# Patient Record
Sex: FEMALE | Race: WHITE | NOT HISPANIC OR LATINO | Employment: OTHER | URBAN - METROPOLITAN AREA
[De-identification: names, ages, dates, MRNs, and addresses within clinical notes are randomized per-mention and may not be internally consistent; named-entity substitution may affect disease eponyms.]

---

## 2017-12-21 ENCOUNTER — TELEPHONE (OUTPATIENT)
Dept: HEMATOLOGY/ONCOLOGY | Facility: CLINIC | Age: 67
End: 2017-12-21

## 2017-12-21 DIAGNOSIS — D50.0 ANEMIA DUE TO CHRONIC BLOOD LOSS: Primary | ICD-10-CM

## 2017-12-21 NOTE — TELEPHONE ENCOUNTER
----- Message from Eda Ruggiero sent at 12/21/2017  1:24 PM CST -----  Contact: spouse, Dr Teo Polo  Patient's spouse, Dr Teo Polo called asking for advice about getting his wife in due to blood work levels hemoglobin is 6.7 and HCT is 24.6, Red blood cell count is 3.65.  Please call patient's spouse at [phone]. Thanks!

## 2017-12-21 NOTE — TELEPHONE ENCOUNTER
Pt's spouse called to schedule an appt with Dr. Santiago.  The patient's H/H 6.7/24.6 with RBC 3.65.  Spouse states that she is asymptomatic and believes that this is conditional anemia.  Spouse was instructed to present to ED if pt developed any symptoms.  Spouse verbalized understanding.

## 2017-12-21 NOTE — TELEPHONE ENCOUNTER
Spoke with spouse to inform that labs have been ordered for 12/22.  They will present to the [facility] Draw for labs.  Verbalized understanding.

## 2017-12-22 ENCOUNTER — LAB VISIT (OUTPATIENT)
Dept: LAB | Facility: HOSPITAL | Age: 67
End: 2017-12-22
Attending: INTERNAL MEDICINE
Payer: MEDICARE

## 2017-12-22 DIAGNOSIS — D50.0 ANEMIA DUE TO CHRONIC BLOOD LOSS: ICD-10-CM

## 2017-12-22 LAB
ALBUMIN SERPL BCP-MCNC: 4.7 G/DL
ALP SERPL-CCNC: 74 U/L
ALT SERPL W/O P-5'-P-CCNC: 30 U/L
ANION GAP SERPL CALC-SCNC: 11 MMOL/L
AST SERPL-CCNC: 25 U/L
BILIRUB SERPL-MCNC: 0.3 MG/DL
BUN SERPL-MCNC: 8 MG/DL
CALCIUM SERPL-MCNC: 9.3 MG/DL
CHLORIDE SERPL-SCNC: 107 MMOL/L
CO2 SERPL-SCNC: 26 MMOL/L
CREAT SERPL-MCNC: 0.6 MG/DL
EST. GFR  (AFRICAN AMERICAN): >60 ML/MIN/1.73 M^2
EST. GFR  (NON AFRICAN AMERICAN): >60 ML/MIN/1.73 M^2
FERRITIN SERPL-MCNC: 6 NG/ML
GLUCOSE SERPL-MCNC: 91 MG/DL
IRON SATN MFR SERPL: 4 %
IRON SERPL-MCNC: 22 UG/DL
LDH SERPL L TO P-CCNC: 476 U/L
POTASSIUM SERPL-SCNC: 4 MMOL/L
PROT SERPL-MCNC: 7.6 G/DL
SODIUM SERPL-SCNC: 144 MMOL/L
TOTAL IRON BINDING CAPACITY: 513 UG/DL

## 2017-12-22 PROCEDURE — 83615 LACTATE (LD) (LDH) ENZYME: CPT

## 2017-12-22 PROCEDURE — 82728 ASSAY OF FERRITIN: CPT

## 2017-12-22 PROCEDURE — 83540 ASSAY OF IRON: CPT | Mod: PN

## 2017-12-22 PROCEDURE — 80053 COMPREHEN METABOLIC PANEL: CPT | Mod: PN

## 2017-12-22 PROCEDURE — 36415 COLL VENOUS BLD VENIPUNCTURE: CPT | Mod: PN

## 2017-12-22 PROCEDURE — 82728 ASSAY OF FERRITIN: CPT | Mod: PN

## 2017-12-22 PROCEDURE — 83540 ASSAY OF IRON: CPT

## 2017-12-22 PROCEDURE — 80053 COMPREHEN METABOLIC PANEL: CPT

## 2017-12-22 PROCEDURE — 83615 LACTATE (LD) (LDH) ENZYME: CPT | Mod: PN

## 2017-12-26 ENCOUNTER — TELEPHONE (OUTPATIENT)
Dept: HEMATOLOGY/ONCOLOGY | Facility: CLINIC | Age: 67
End: 2017-12-26

## 2017-12-26 ENCOUNTER — TELEPHONE (OUTPATIENT)
Dept: INFUSION THERAPY | Facility: HOSPITAL | Age: 67
End: 2017-12-26

## 2017-12-26 DIAGNOSIS — D50.9 IRON DEFICIENCY ANEMIA, UNSPECIFIED IRON DEFICIENCY ANEMIA TYPE: ICD-10-CM

## 2017-12-26 RX ORDER — DIPHENHYDRAMINE HYDROCHLORIDE 50 MG/ML
50 INJECTION INTRAMUSCULAR; INTRAVENOUS ONCE
Status: CANCELLED
Start: 2018-01-05

## 2017-12-26 RX ORDER — SODIUM CHLORIDE 0.9 % (FLUSH) 0.9 %
10 SYRINGE (ML) INJECTION
Status: CANCELLED | OUTPATIENT
Start: 2018-01-05

## 2017-12-26 RX ORDER — FAMOTIDINE 10 MG/ML
20 INJECTION INTRAVENOUS DAILY
Status: CANCELLED
Start: 2018-01-05

## 2017-12-26 RX ORDER — HEPARIN 100 UNIT/ML
500 SYRINGE INTRAVENOUS
Status: CANCELLED | OUTPATIENT
Start: 2018-01-05

## 2017-12-26 NOTE — TELEPHONE ENCOUNTER
[12/26/2017 11:35 AM] Samia Alaniz:   We need to place someone on for Ferrehem either Wed or Fri....could you all do?  Dr. CRISOSTOMO is in the process of doing build?  [12/26/2017 11:39 AM] Ana M Quintanilla:   feraheme is giving day1 and day 4 this would probably not be into after the 1st of the year   [12/26/2017 11:44 AM] Samia Alaniz:   Ok, can we start her next Tuesday and then Friday.  Her count is extremely low...   Thanks for helping out.  I am having to relearn the drugs.  [12/26/2017 11:45 AM] Samia Alaniz:   Dr. CRISOSTOMO will get the build compelted today  [12/26/2017 11:46 AM] Ana M Quintanilla:   Who is the patient?   [12/26/2017 11:57 AM] Samia Alaniz:   Lavern Polo [id number]   She will need to come in the am on Tuesday .  Just anytime before lunch.  How long does the infusion usually take?  [12/26/2017 12:00 PM] Ana M Quintanilla:   the best I can do is friday 01/05/18 @11:30 and 01/08/18 @11:30  [12/26/2017 12:01 PM] Samia Alaniz:   Ok, I will take that.  How long?  [12/26/2017 12:02 PM] Ana M Quintanilla:   1 hour  [12/26/2017 12:02 PM] Samia Alaniz:   okay thank you.  [12/26/2017 12:02 PM] Ana M Quintanilla:   welcome

## 2017-12-27 DIAGNOSIS — D50.0 ANEMIA DUE TO CHRONIC BLOOD LOSS: Primary | ICD-10-CM

## 2017-12-27 LAB — STFR SERPL-MCNC: 20.9 MG/L

## 2018-01-05 ENCOUNTER — INFUSION (OUTPATIENT)
Dept: INFUSION THERAPY | Facility: HOSPITAL | Age: 68
End: 2018-01-05
Attending: INTERNAL MEDICINE
Payer: MEDICARE

## 2018-01-05 VITALS
RESPIRATION RATE: 18 BRPM | TEMPERATURE: 99 F | HEART RATE: 66 BPM | WEIGHT: 142.5 LBS | SYSTOLIC BLOOD PRESSURE: 133 MMHG | DIASTOLIC BLOOD PRESSURE: 68 MMHG

## 2018-01-05 DIAGNOSIS — D50.9 IRON DEFICIENCY ANEMIA, UNSPECIFIED IRON DEFICIENCY ANEMIA TYPE: Primary | ICD-10-CM

## 2018-01-05 PROCEDURE — 63600175 PHARM REV CODE 636 W HCPCS: Mod: PN | Performed by: INTERNAL MEDICINE

## 2018-01-05 PROCEDURE — 25000003 PHARM REV CODE 250: Mod: PN | Performed by: INTERNAL MEDICINE

## 2018-01-05 PROCEDURE — 96367 TX/PROPH/DG ADDL SEQ IV INF: CPT | Mod: PN

## 2018-01-05 PROCEDURE — 96375 TX/PRO/DX INJ NEW DRUG ADDON: CPT | Mod: PN

## 2018-01-05 PROCEDURE — S0028 INJECTION, FAMOTIDINE, 20 MG: HCPCS | Mod: PN | Performed by: INTERNAL MEDICINE

## 2018-01-05 PROCEDURE — 96365 THER/PROPH/DIAG IV INF INIT: CPT | Mod: PN

## 2018-01-05 RX ORDER — SODIUM CHLORIDE 0.9 % (FLUSH) 0.9 %
10 SYRINGE (ML) INJECTION
Status: CANCELLED | OUTPATIENT
Start: 2018-01-15

## 2018-01-05 RX ORDER — FAMOTIDINE 10 MG/ML
20 INJECTION INTRAVENOUS DAILY
Status: CANCELLED
Start: 2018-01-15

## 2018-01-05 RX ORDER — FAMOTIDINE 10 MG/ML
20 INJECTION INTRAVENOUS DAILY
Status: DISCONTINUED | OUTPATIENT
Start: 2018-01-05 | End: 2018-01-05 | Stop reason: HOSPADM

## 2018-01-05 RX ORDER — SODIUM CHLORIDE 0.9 % (FLUSH) 0.9 %
10 SYRINGE (ML) INJECTION
Status: DISCONTINUED | OUTPATIENT
Start: 2018-01-05 | End: 2018-01-05 | Stop reason: HOSPADM

## 2018-01-05 RX ORDER — DEXAMETHASONE SODIUM PHOSPHATE 4 MG/ML
4 INJECTION, SOLUTION INTRA-ARTICULAR; INTRALESIONAL; INTRAMUSCULAR; INTRAVENOUS; SOFT TISSUE
Status: COMPLETED | OUTPATIENT
Start: 2018-01-05 | End: 2018-01-05

## 2018-01-05 RX ORDER — DIPHENHYDRAMINE HYDROCHLORIDE 50 MG/ML
50 INJECTION INTRAMUSCULAR; INTRAVENOUS ONCE
Status: CANCELLED
Start: 2018-01-15

## 2018-01-05 RX ORDER — CARISOPRODOL 350 MG/1
350 TABLET ORAL
Refills: 0 | COMMUNITY
Start: 2017-12-15 | End: 2019-09-20

## 2018-01-05 RX ORDER — DIPHENHYDRAMINE HYDROCHLORIDE 50 MG/ML
50 INJECTION INTRAMUSCULAR; INTRAVENOUS ONCE
Status: DISCONTINUED | OUTPATIENT
Start: 2018-01-05 | End: 2018-01-05

## 2018-01-05 RX ORDER — HEPARIN 100 UNIT/ML
500 SYRINGE INTRAVENOUS
Status: CANCELLED | OUTPATIENT
Start: 2018-01-15

## 2018-01-05 RX ADMIN — DIPHENHYDRAMINE HYDROCHLORIDE 50 MG: 50 INJECTION, SOLUTION INTRAMUSCULAR; INTRAVENOUS at 11:01

## 2018-01-05 RX ADMIN — SODIUM CHLORIDE: 9 INJECTION, SOLUTION INTRAVENOUS at 11:01

## 2018-01-05 RX ADMIN — FAMOTIDINE 20 MG: 10 INJECTION, SOLUTION INTRAVENOUS at 11:01

## 2018-01-05 RX ADMIN — FERUMOXYTOL 510 MG: 510 INJECTION INTRAVENOUS at 12:01

## 2018-01-05 RX ADMIN — DEXAMETHASONE SODIUM PHOSPHATE 4 MG: 4 INJECTION, SOLUTION INTRA-ARTICULAR; INTRALESIONAL; INTRAMUSCULAR; INTRAVENOUS; SOFT TISSUE at 11:01

## 2018-01-08 ENCOUNTER — OFFICE VISIT (OUTPATIENT)
Dept: HEMATOLOGY/ONCOLOGY | Facility: CLINIC | Age: 68
End: 2018-01-08
Payer: MEDICARE

## 2018-01-08 ENCOUNTER — INFUSION (OUTPATIENT)
Dept: INFUSION THERAPY | Facility: HOSPITAL | Age: 68
End: 2018-01-08
Attending: INTERNAL MEDICINE
Payer: MEDICARE

## 2018-01-08 VITALS
HEART RATE: 59 BPM | HEIGHT: 63 IN | BODY MASS INDEX: 24.18 KG/M2 | SYSTOLIC BLOOD PRESSURE: 146 MMHG | TEMPERATURE: 98 F | RESPIRATION RATE: 16 BRPM | WEIGHT: 136.44 LBS | DIASTOLIC BLOOD PRESSURE: 86 MMHG

## 2018-01-08 VITALS
BODY MASS INDEX: 24.18 KG/M2 | HEIGHT: 63 IN | TEMPERATURE: 98 F | RESPIRATION RATE: 16 BRPM | HEART RATE: 61 BPM | DIASTOLIC BLOOD PRESSURE: 79 MMHG | SYSTOLIC BLOOD PRESSURE: 119 MMHG | WEIGHT: 136.44 LBS

## 2018-01-08 DIAGNOSIS — D50.9 IRON DEFICIENCY ANEMIA, UNSPECIFIED IRON DEFICIENCY ANEMIA TYPE: Primary | ICD-10-CM

## 2018-01-08 PROCEDURE — 96365 THER/PROPH/DIAG IV INF INIT: CPT | Mod: PN

## 2018-01-08 PROCEDURE — S0028 INJECTION, FAMOTIDINE, 20 MG: HCPCS | Mod: PN | Performed by: INTERNAL MEDICINE

## 2018-01-08 PROCEDURE — A4216 STERILE WATER/SALINE, 10 ML: HCPCS | Mod: PN | Performed by: INTERNAL MEDICINE

## 2018-01-08 PROCEDURE — 96375 TX/PRO/DX INJ NEW DRUG ADDON: CPT | Mod: PN

## 2018-01-08 PROCEDURE — 99213 OFFICE O/P EST LOW 20 MIN: CPT | Mod: PBBFAC,PN,25 | Performed by: INTERNAL MEDICINE

## 2018-01-08 PROCEDURE — 63600175 PHARM REV CODE 636 W HCPCS: Mod: TB,PN | Performed by: INTERNAL MEDICINE

## 2018-01-08 PROCEDURE — 25000003 PHARM REV CODE 250: Mod: PN | Performed by: INTERNAL MEDICINE

## 2018-01-08 PROCEDURE — 99204 OFFICE O/P NEW MOD 45 MIN: CPT | Mod: S$PBB,,, | Performed by: INTERNAL MEDICINE

## 2018-01-08 PROCEDURE — 99999 PR PBB SHADOW E&M-EST. PATIENT-LVL III: CPT | Mod: PBBFAC,,, | Performed by: INTERNAL MEDICINE

## 2018-01-08 RX ORDER — FAMOTIDINE 10 MG/ML
20 INJECTION INTRAVENOUS DAILY
Status: CANCELLED
Start: 2018-01-15

## 2018-01-08 RX ORDER — DIPHENHYDRAMINE HYDROCHLORIDE 50 MG/ML
25 INJECTION INTRAMUSCULAR; INTRAVENOUS ONCE
Status: COMPLETED | OUTPATIENT
Start: 2018-01-08 | End: 2018-01-08

## 2018-01-08 RX ORDER — SODIUM CHLORIDE 0.9 % (FLUSH) 0.9 %
10 SYRINGE (ML) INJECTION
Status: CANCELLED | OUTPATIENT
Start: 2018-01-15

## 2018-01-08 RX ORDER — HEPARIN 100 UNIT/ML
500 SYRINGE INTRAVENOUS
Status: CANCELLED | OUTPATIENT
Start: 2018-01-15

## 2018-01-08 RX ORDER — SODIUM CHLORIDE 0.9 % (FLUSH) 0.9 %
10 SYRINGE (ML) INJECTION
Status: DISCONTINUED | OUTPATIENT
Start: 2018-01-08 | End: 2018-01-08 | Stop reason: HOSPADM

## 2018-01-08 RX ORDER — FAMOTIDINE 10 MG/ML
20 INJECTION INTRAVENOUS DAILY
Status: DISCONTINUED | OUTPATIENT
Start: 2018-01-08 | End: 2018-01-08 | Stop reason: HOSPADM

## 2018-01-08 RX ORDER — DIPHENHYDRAMINE HYDROCHLORIDE 50 MG/ML
25 INJECTION INTRAMUSCULAR; INTRAVENOUS ONCE
Status: CANCELLED
Start: 2018-01-15

## 2018-01-08 RX ORDER — DEXAMETHASONE SODIUM PHOSPHATE 4 MG/ML
4 INJECTION, SOLUTION INTRA-ARTICULAR; INTRALESIONAL; INTRAMUSCULAR; INTRAVENOUS; SOFT TISSUE
Status: COMPLETED | OUTPATIENT
Start: 2018-01-08 | End: 2018-01-08

## 2018-01-08 RX ADMIN — FERUMOXYTOL 510 MG: 510 INJECTION INTRAVENOUS at 09:01

## 2018-01-08 RX ADMIN — SODIUM CHLORIDE: 900 INJECTION, SOLUTION INTRAVENOUS at 09:01

## 2018-01-08 RX ADMIN — DIPHENHYDRAMINE HYDROCHLORIDE 25 MG: 50 INJECTION INTRAMUSCULAR; INTRAVENOUS at 09:01

## 2018-01-08 RX ADMIN — FAMOTIDINE 20 MG: 10 INJECTION, SOLUTION INTRAVENOUS at 09:01

## 2018-01-08 RX ADMIN — Medication 10 ML: at 09:01

## 2018-01-08 RX ADMIN — DEXAMETHASONE SODIUM PHOSPHATE 4 MG: 4 INJECTION, SOLUTION INTRA-ARTICULAR; INTRALESIONAL; INTRAMUSCULAR; INTRAVENOUS; SOFT TISSUE at 09:01

## 2018-01-08 NOTE — PATIENT INSTRUCTIONS
Iron-Deficiency Anemia (Adult)  Red blood cells carry oxygen to the tissues of your body. Anemia is a condition in which you have too few red blood cells. You need iron to make red cells. Anemia makes you feel tired and run down. When anemia becomes severe, your skin becomes pale. You may feel short of breath after physical activity. Other symptoms include:  · Headaches  · Dizziness  · Leg cramps with physical activity  · Drowsiness  · Restless legs  Your anemia is caused by not having enough iron in your body. This may be because of:  · Loss of blood. This can be caused by heavy menstrual periods. It can also be caused by bleeding from the stomach or intestines.  · Poor diet. You may not be eating enough foods that contain iron.  · Inability to absorb iron from the foods you eat  · Pregnancy  If your blood count is low enough, your healthcare provider may prescribe an iron supplement. It usually takes about 2 to 3 months of treatment with iron supplements to correct anemia. Severe cases of anemia need a blood transfusion to quickly ease symptoms and deliver more oxygen to the cells.  Home care  Follow these guidelines when caring for yourself at home:  · Eat foods high in iron. This will boost the amount of iron stored in your body. It is a natural way to build up the number of blood cells. Good sources of iron include beef, liver, spinach and other dark green leafy vegetables, whole grains, beans, and nuts.  · Do not overexert yourself.  · Talk with your healthcare provider before traveling by air or traveling to high altitudes.  Follow-up care  Follow up with your healthcare provider in 2 months, or as advised. This is to have another red blood cell count to be sure your anemia has been fixed.  When to seek medical advice  Call your healthcare provider right away if any of these occur:  · Shortness of breath or chest pain  · Dizziness or fainting  · Vomiting blood or passing red or black-colored stool   Date  Last Reviewed: 2/25/2016  © 3013-0760 [employer]. [address] [place] [address], [place] [address]. All rights reserved. This information is not intended as a substitute for professional medical care. Always follow your healthcare professional's instructions.

## 2018-01-08 NOTE — PLAN OF CARE
Problem: Patient Care Overview  Goal: Plan of Care Review  Outcome: Ongoing (interventions implemented as appropriate)  Pt tolerated infusion well, NAD, no c/o voiced, pt given AVS with appointment schedule, pt ambulated off unit without difficulty with .

## 2018-01-09 NOTE — PROGRESS NOTES
Date of Service: 2018  CONSULT NOTE    HISTORY OF PRESENT ILLNESS:  The patient is a 67-year-old white female who   presents as self-referral after incidentally discovering profound anemia on   health maintenance laboratory.  The patient's hemoglobin was 6.7.  She had iron   studies obtained, which were remarkable for profound deficiency.  She does not   exclude dietary sources of iron and does not routinely use PPI.  No overt signs   or symptoms of anemia or GI blood loss.  No other complaints or pertinent   findings on a 14-point review of systems.  The patient has been placed on   replacement Feraheme and has completed one to two doses.  She was mildly sedated   with Benadryl premeds and request that we half dose this before the second   treatment.  No other pertinent findings on a 14-point review of systems.    PAST MEDICAL HISTORY:  1.  Iron deficiency anemia.  2.  Status post appendectomy.    ALLERGIES:  Ibuprofen.    MEDICATIONS:  Soma p.r.n. muscle spasm.    FAMILY AND SOCIAL HISTORY:  The patient uses alcohol two times a week.  Smokes   half pack of cigarettes weekly.  Father  from coronary artery disease.    Mother  from staph infection.  Sibling  due to CNS malignancy.    PHYSICAL EXAMINATION:  GENERAL:  Well-developed, thin-appearing, white female in no acute distress.  VITAL SIGNS:  Weight 136-1/2 pounds.  Documented in EMR and reviewed.  HEENT:  Normocephalic, atraumatic.  Oral mucosa pink and moist.  Lips without   lesions.  Tongue midline.  Oropharynx clear.  Nonicteric sclerae.   NECK:  Supple, no adenopathy.  No carotid bruits, thyromegaly or thyroid nodule.  HEART:  Regular rate and rhythm without murmur, gallop or rub.                LUNGS:  Clear to auscultation bilaterally.  Normal respiratory effort.       ABDOMEN:  Soft, nontender, nondistended with positive normoactive bowel sounds,   no hepatosplenomegaly.    EXTREMITIES:  No cyanosis, clubbing or edema.   Distal pulses are intact.  AXILLAE AND GROIN:  No palpable pathologic lymphadenopathy is appreciated.   SKIN:  Intact/turgor normal.  NEUROLOGIC:  Cranial nerves II-XII grossly intact.  Motor:  Good muscle bulk and   tone.  Strength/sensory 5/5 throughout.  Gait stable.    LABORATORY:  Obtained at [facility] and dated 12/2017 reveals an H and H   6.7 and 24.6, platelets of 300 and MCV is depressed at 67.4.  Differential was   remarkable, white count is 4.6.  Sodium 142, potassium 3.9, chloride 109, CO2   25, BUN 9, creatinine 0.6, glucose 96.  Liver function tests are within normal   limits.  GFR 95.  Hemoccult negative x3.    IMPRESSION:  1.  Iron deficiency anemia.  2.  Oversedation on Benadryl.    PLAN:  1.  Proceed with second dose of Feraheme.  2.  Decrease Benadryl premeds at 25 mg.  3.  Return to clinic in three weeks with interval CBC to document response.  4.  Once the patient's hemoglobin and hematocrit are better compensated, we will   consider elective colonoscopy as the patient has not had age-appropriate   screening.      SARAH/HN  dd: 01/08/2018 08:50:11 (CST)  td: 01/08/2018 23:50:43 (CST)  Doc ID   #[id number]  Job ID #[id number]    CC:

## 2018-01-26 ENCOUNTER — LAB VISIT (OUTPATIENT)
Dept: LAB | Facility: HOSPITAL | Age: 68
End: 2018-01-26
Attending: INTERNAL MEDICINE
Payer: MEDICARE

## 2018-01-26 DIAGNOSIS — D50.9 IRON DEFICIENCY ANEMIA, UNSPECIFIED IRON DEFICIENCY ANEMIA TYPE: ICD-10-CM

## 2018-01-26 LAB
ANISOCYTOSIS BLD QL SMEAR: ABNORMAL
BASOPHILS # BLD AUTO: 0.02 K/UL
BASOPHILS NFR BLD: 0.4 %
DIFFERENTIAL METHOD: ABNORMAL
EOSINOPHIL # BLD AUTO: 0.1 K/UL
EOSINOPHIL NFR BLD: 1.8 %
ERYTHROCYTE [DISTWIDTH] IN BLOOD BY AUTOMATED COUNT: ABNORMAL %
HCT VFR BLD AUTO: 35.3 %
HGB BLD-MCNC: 10.8 G/DL
HYPOCHROMIA BLD QL SMEAR: ABNORMAL
LYMPHOCYTES # BLD AUTO: 1.2 K/UL
LYMPHOCYTES NFR BLD: 21.2 %
MCH RBC QN AUTO: 24.4 PG
MCHC RBC AUTO-ENTMCNC: 30.6 G/DL
MCV RBC AUTO: 80 FL
MONOCYTES # BLD AUTO: 0.5 K/UL
MONOCYTES NFR BLD: 8.2 %
NEUTROPHILS # BLD AUTO: 3.9 K/UL
NEUTROPHILS NFR BLD: 68.4 %
PLATELET # BLD AUTO: 406 K/UL
PLATELET BLD QL SMEAR: ABNORMAL
PMV BLD AUTO: 9.7 FL
RBC # BLD AUTO: 4.43 M/UL
WBC # BLD AUTO: 5.7 K/UL

## 2018-01-26 PROCEDURE — 36415 COLL VENOUS BLD VENIPUNCTURE: CPT | Mod: PO

## 2018-01-26 PROCEDURE — 85025 COMPLETE CBC W/AUTO DIFF WBC: CPT | Mod: PO

## 2018-01-29 ENCOUNTER — OFFICE VISIT (OUTPATIENT)
Dept: HEMATOLOGY/ONCOLOGY | Facility: CLINIC | Age: 68
End: 2018-01-29
Payer: MEDICARE

## 2018-01-29 VITALS
DIASTOLIC BLOOD PRESSURE: 66 MMHG | HEIGHT: 63 IN | TEMPERATURE: 98 F | SYSTOLIC BLOOD PRESSURE: 127 MMHG | BODY MASS INDEX: 24.38 KG/M2 | WEIGHT: 137.56 LBS | RESPIRATION RATE: 17 BRPM | HEART RATE: 52 BPM

## 2018-01-29 DIAGNOSIS — D50.9 IRON DEFICIENCY ANEMIA, UNSPECIFIED IRON DEFICIENCY ANEMIA TYPE: Primary | ICD-10-CM

## 2018-01-29 PROCEDURE — 99213 OFFICE O/P EST LOW 20 MIN: CPT | Mod: PBBFAC,PN | Performed by: INTERNAL MEDICINE

## 2018-01-29 PROCEDURE — 99999 PR PBB SHADOW E&M-EST. PATIENT-LVL III: CPT | Mod: PBBFAC,,, | Performed by: INTERNAL MEDICINE

## 2018-01-29 PROCEDURE — 99213 OFFICE O/P EST LOW 20 MIN: CPT | Mod: S$PBB,,, | Performed by: INTERNAL MEDICINE

## 2018-01-29 RX ORDER — FLUTICASONE PROPIONATE AND SALMETEROL 50; 250 UG/1; UG/1
POWDER RESPIRATORY (INHALATION)
Refills: 3 | COMMUNITY
Start: 2017-12-13 | End: 2018-01-29

## 2018-01-29 RX ORDER — HYDROCHLOROTHIAZIDE 25 MG/1
TABLET ORAL DAILY PRN
COMMUNITY
Start: 2018-01-15 | End: 2022-05-02

## 2018-01-29 RX ORDER — ALBUTEROL SULFATE 90 UG/1
AEROSOL, METERED RESPIRATORY (INHALATION)
COMMUNITY
Start: 2018-01-09 | End: 2018-01-29

## 2018-01-31 ENCOUNTER — TELEPHONE (OUTPATIENT)
Dept: GASTROENTEROLOGY | Facility: CLINIC | Age: 68
End: 2018-01-31

## 2018-01-31 DIAGNOSIS — Z12.11 SCREENING FOR COLORECTAL CANCER: Primary | ICD-10-CM

## 2018-01-31 DIAGNOSIS — Z12.12 SCREENING FOR COLORECTAL CANCER: Primary | ICD-10-CM

## 2018-01-31 NOTE — TELEPHONE ENCOUNTER
Spoke with  instructed received referral for colonoscopy states he could not talk will call back.

## 2018-02-02 ENCOUNTER — PATIENT MESSAGE (OUTPATIENT)
Dept: HEMATOLOGY/ONCOLOGY | Facility: CLINIC | Age: 68
End: 2018-02-02

## 2018-02-28 ENCOUNTER — PATIENT MESSAGE (OUTPATIENT)
Dept: HEMATOLOGY/ONCOLOGY | Facility: CLINIC | Age: 68
End: 2018-02-28

## 2018-04-27 ENCOUNTER — LAB VISIT (OUTPATIENT)
Dept: LAB | Facility: HOSPITAL | Age: 68
End: 2018-04-27
Attending: INTERNAL MEDICINE
Payer: MEDICARE

## 2018-04-27 DIAGNOSIS — D50.9 IRON DEFICIENCY ANEMIA, UNSPECIFIED IRON DEFICIENCY ANEMIA TYPE: ICD-10-CM

## 2018-04-27 LAB
BASOPHILS # BLD AUTO: 0.01 K/UL
BASOPHILS NFR BLD: 0.2 %
DIFFERENTIAL METHOD: ABNORMAL
EOSINOPHIL # BLD AUTO: 0.2 K/UL
EOSINOPHIL NFR BLD: 2.8 %
ERYTHROCYTE [DISTWIDTH] IN BLOOD BY AUTOMATED COUNT: 14.1 %
HCT VFR BLD AUTO: 36.5 %
HGB BLD-MCNC: 12 G/DL
LYMPHOCYTES # BLD AUTO: 1.8 K/UL
LYMPHOCYTES NFR BLD: 28.7 %
MCH RBC QN AUTO: 30.8 PG
MCHC RBC AUTO-ENTMCNC: 32.9 G/DL
MCV RBC AUTO: 94 FL
MONOCYTES # BLD AUTO: 0.6 K/UL
MONOCYTES NFR BLD: 9.4 %
NEUTROPHILS # BLD AUTO: 3.6 K/UL
NEUTROPHILS NFR BLD: 58.9 %
PLATELET # BLD AUTO: 246 K/UL
PMV BLD AUTO: 9.4 FL
RBC # BLD AUTO: 3.89 M/UL
WBC # BLD AUTO: 6.16 K/UL

## 2018-04-27 PROCEDURE — 85025 COMPLETE CBC W/AUTO DIFF WBC: CPT | Mod: PO

## 2018-04-27 PROCEDURE — 36415 COLL VENOUS BLD VENIPUNCTURE: CPT | Mod: PO

## 2018-04-30 ENCOUNTER — OFFICE VISIT (OUTPATIENT)
Dept: HEMATOLOGY/ONCOLOGY | Facility: CLINIC | Age: 68
End: 2018-04-30
Payer: MEDICARE

## 2018-04-30 VITALS
WEIGHT: 138.69 LBS | HEART RATE: 62 BPM | RESPIRATION RATE: 16 BRPM | SYSTOLIC BLOOD PRESSURE: 114 MMHG | BODY MASS INDEX: 24.57 KG/M2 | DIASTOLIC BLOOD PRESSURE: 63 MMHG | HEIGHT: 63 IN | TEMPERATURE: 98 F

## 2018-04-30 DIAGNOSIS — D50.9 IRON DEFICIENCY ANEMIA, UNSPECIFIED IRON DEFICIENCY ANEMIA TYPE: Primary | ICD-10-CM

## 2018-04-30 PROCEDURE — 99213 OFFICE O/P EST LOW 20 MIN: CPT | Mod: PBBFAC,PN | Performed by: INTERNAL MEDICINE

## 2018-04-30 PROCEDURE — 99999 PR PBB SHADOW E&M-EST. PATIENT-LVL III: CPT | Mod: PBBFAC,,, | Performed by: INTERNAL MEDICINE

## 2018-04-30 PROCEDURE — 99213 OFFICE O/P EST LOW 20 MIN: CPT | Mod: S$PBB,,, | Performed by: INTERNAL MEDICINE

## 2018-04-30 NOTE — PROGRESS NOTES
The patient is a 67-year-old white female well known to me for iron deficiency   anemia who received intravenous Feraheme had a good initial response and returns   to clinic for interval reevaluation.  She has no signs or symptoms consistent   with GI blood loss and denies any symptoms of anemia.    PHYSICAL EXAMINATION:  GENERAL:  Well-developed, well-nourished elderly white female in no acute   distress.  VITAL SIGNS:  Weight of 138-1/2 pounds.  HEENT:  Normocephalic, atraumatic.  Oral mucosa pink and moist.  Lips without   lesions.  Tongue midline.  Oropharynx clear.  Nonicteric sclerae.   NECK:  Supple.  No adenopathy.                                               HEART:  Regular rate and rhythm without murmur, gallop or rub.               LUNGS:  Clear to auscultation bilaterally.                                   ABDOMEN:  Soft, nontender and nondistended with positive normoactive bowel   sounds.  No hepatosplenomegaly.                                              EXTREMITIES:  No cyanosis, clubbing or edema.  Distal pulses are intact.       LABORATORY:  White count 6.2, H and H 12.0 and 36.5, platelets 246.    IMPRESSION:  Iron deficiency anemia -- resolved following Feraheme.    PLAN:  Reevaluate in six months from now with interval CBC.      SARAH/HN  dd: 04/30/2018 13:48:38 (CDT)  td: 04/30/2018 22:51:21 (CDT)  Doc ID   #[id number]  Job ID #[id number]    CC:

## 2018-05-01 ENCOUNTER — PATIENT MESSAGE (OUTPATIENT)
Dept: HEMATOLOGY/ONCOLOGY | Facility: CLINIC | Age: 68
End: 2018-05-01

## 2018-10-31 ENCOUNTER — LAB VISIT (OUTPATIENT)
Dept: LAB | Facility: HOSPITAL | Age: 68
End: 2018-10-31
Attending: INTERNAL MEDICINE
Payer: MEDICARE

## 2018-10-31 ENCOUNTER — OFFICE VISIT (OUTPATIENT)
Dept: HEMATOLOGY/ONCOLOGY | Facility: CLINIC | Age: 68
End: 2018-10-31
Payer: MEDICARE

## 2018-10-31 VITALS
HEIGHT: 63 IN | WEIGHT: 147.69 LBS | RESPIRATION RATE: 18 BRPM | TEMPERATURE: 99 F | HEART RATE: 64 BPM | DIASTOLIC BLOOD PRESSURE: 71 MMHG | SYSTOLIC BLOOD PRESSURE: 113 MMHG | BODY MASS INDEX: 26.17 KG/M2

## 2018-10-31 DIAGNOSIS — D50.9 IRON DEFICIENCY ANEMIA, UNSPECIFIED IRON DEFICIENCY ANEMIA TYPE: ICD-10-CM

## 2018-10-31 DIAGNOSIS — D50.9 IRON DEFICIENCY ANEMIA, UNSPECIFIED IRON DEFICIENCY ANEMIA TYPE: Primary | ICD-10-CM

## 2018-10-31 LAB
BASOPHILS # BLD AUTO: 0.04 K/UL
BASOPHILS NFR BLD: 0.6 %
DIFFERENTIAL METHOD: ABNORMAL
EOSINOPHIL # BLD AUTO: 0.1 K/UL
EOSINOPHIL NFR BLD: 2 %
ERYTHROCYTE [DISTWIDTH] IN BLOOD BY AUTOMATED COUNT: 12.5 %
HCT VFR BLD AUTO: 34.7 %
HGB BLD-MCNC: 11.2 G/DL
LYMPHOCYTES # BLD AUTO: 2 K/UL
LYMPHOCYTES NFR BLD: 29 %
MCH RBC QN AUTO: 29.9 PG
MCHC RBC AUTO-ENTMCNC: 32.3 G/DL
MCV RBC AUTO: 93 FL
MONOCYTES # BLD AUTO: 0.6 K/UL
MONOCYTES NFR BLD: 9 %
NEUTROPHILS # BLD AUTO: 4.1 K/UL
NEUTROPHILS NFR BLD: 59.4 %
NRBC BLD-RTO: 0 /100 WBC
PLATELET # BLD AUTO: 322 K/UL
PMV BLD AUTO: 9.4 FL
RBC # BLD AUTO: 3.75 M/UL
WBC # BLD AUTO: 6.9 K/UL

## 2018-10-31 PROCEDURE — 99999 PR PBB SHADOW E&M-EST. PATIENT-LVL III: CPT | Mod: PBBFAC,,, | Performed by: NURSE PRACTITIONER

## 2018-10-31 PROCEDURE — 85025 COMPLETE CBC W/AUTO DIFF WBC: CPT

## 2018-10-31 PROCEDURE — 36415 COLL VENOUS BLD VENIPUNCTURE: CPT | Mod: PN

## 2018-10-31 PROCEDURE — 99213 OFFICE O/P EST LOW 20 MIN: CPT | Mod: S$PBB,,, | Performed by: NURSE PRACTITIONER

## 2018-10-31 PROCEDURE — 99213 OFFICE O/P EST LOW 20 MIN: CPT | Mod: PBBFAC,PN | Performed by: NURSE PRACTITIONER

## 2018-10-31 PROCEDURE — 85025 COMPLETE CBC W/AUTO DIFF WBC: CPT | Mod: PN

## 2018-10-31 NOTE — PROGRESS NOTES
HISTORY OF PRESENT ILLNESS:  The patient is a 68-year-old white female   well known to Dr. Santiago for iron deficiency anemia.  Last labs obtained were   in 2005 in which she was normal.  Her , a retired physician, ordered   labs for evaluation.  Her hemoglobin was found to be 6.7.  She was asymptomatic.    She carried on daily activities as well as watching young grandchildren without   a problem.  Iron studies were depleted and stools were negative for blood.    She was infused with Feraheme in December 2017 & January 2018.      She presents to the clinic today with her  for a 6 month interval evaluation.    She continues to stay active.  She denies any dyspnea, chest pain, irregular   heart rate, weakness, fatigue, bleeding, pica, cold hands/feet, etc.  No new   complaints or pertinent findings on a 10 point review of systems.    PHYSICAL EXAMINATION:  GENERAL:  Well-developed, well-nourished elderly white female in no acute   distress.  Alert & oriented x 3.  VITAL SIGNS:  Weight:  Gain of 9 pounds in 6 months  Wt Readings from Last 3 Encounters:   10/31/18 67 kg (147 lb 11.3 oz)   04/30/18 62.9 kg (138 lb 10.7 oz)   01/29/18 62.4 kg (137 lb 9.1 oz)     Temp Readings from Last 3 Encounters:   10/31/18 98.5 °F (36.9 °C)   04/30/18 98 °F (36.7 °C)   01/29/18 97.7 °F (36.5 °C) (Oral)     BP Readings from Last 3 Encounters:   10/31/18 113/71   04/30/18 114/63   01/29/18 127/66     Pulse Readings from Last 3 Encounters:   10/31/18 64   04/30/18 62   01/29/18 (!) 52     HEENT:  Normocephalic, atraumatic.  Oral mucosa pink and moist.  Lips without   lesions.  Tongue midline.  Oropharynx clear.  Nonicteric sclerae.   NECK:  Supple.  No adenopathy.                                               HEART:  Regular rate and rhythm without murmur, gallop or rub.               LUNGS:  Clear to auscultation bilaterally.                                   ABDOMEN:  Soft, nontender and nondistended with positive  normoactive bowel   sounds.  No hepatosplenomegaly.                                              EXTREMITIES:  No cyanosis, clubbing or edema.  Distal pulses are intact.       LABORATORY:   Lab Results   Component Value Date    WBC 6.90 10/31/2018    HGB 11.2 (L) 10/31/2018    HCT 34.7 (L) 10/31/2018    MCV 93 10/31/2018     10/31/2018     Declining hemoglobin 11.2 (12.0, 10.8, 6.7)    IMPRESSION:  Iron deficiency anemia -- s/p Feraheme, asymptomatic.    PLAN:  Reevaluate in six months from now with interval CBC.    Assessment/plan reviewed and approved by Dr. Santiago.

## 2019-04-18 ENCOUNTER — TELEPHONE (OUTPATIENT)
Dept: HEMATOLOGY/ONCOLOGY | Facility: CLINIC | Age: 69
End: 2019-04-18

## 2019-04-18 NOTE — TELEPHONE ENCOUNTER
Attempted to call pt, re appt on 4/30/19.  MD will be out of office.  No answer, unable to leave message.  Appt moved to RW schedule.

## 2019-04-30 ENCOUNTER — OFFICE VISIT (OUTPATIENT)
Dept: HEMATOLOGY/ONCOLOGY | Facility: CLINIC | Age: 69
End: 2019-04-30
Payer: MEDICARE

## 2019-04-30 ENCOUNTER — LAB VISIT (OUTPATIENT)
Dept: LAB | Facility: HOSPITAL | Age: 69
End: 2019-04-30
Attending: NURSE PRACTITIONER
Payer: MEDICARE

## 2019-04-30 VITALS
RESPIRATION RATE: 18 BRPM | SYSTOLIC BLOOD PRESSURE: 107 MMHG | HEART RATE: 58 BPM | BODY MASS INDEX: 25.62 KG/M2 | DIASTOLIC BLOOD PRESSURE: 69 MMHG | HEIGHT: 63 IN | TEMPERATURE: 98 F | WEIGHT: 144.63 LBS

## 2019-04-30 DIAGNOSIS — D50.9 IRON DEFICIENCY ANEMIA, UNSPECIFIED IRON DEFICIENCY ANEMIA TYPE: Primary | ICD-10-CM

## 2019-04-30 DIAGNOSIS — D50.9 IRON DEFICIENCY ANEMIA, UNSPECIFIED IRON DEFICIENCY ANEMIA TYPE: ICD-10-CM

## 2019-04-30 LAB
BASOPHILS # BLD AUTO: 0.04 K/UL (ref 0–0.2)
BASOPHILS NFR BLD: 0.8 % (ref 0–1.9)
DIFFERENTIAL METHOD: ABNORMAL
EOSINOPHIL # BLD AUTO: 0.1 K/UL (ref 0–0.5)
EOSINOPHIL NFR BLD: 2.9 % (ref 0–8)
ERYTHROCYTE [DISTWIDTH] IN BLOOD BY AUTOMATED COUNT: 14.6 % (ref 11.5–14.5)
FERRITIN SERPL-MCNC: 6 NG/ML (ref 12–264)
HCT VFR BLD AUTO: 31.2 % (ref 37–48.5)
HGB BLD-MCNC: 9.7 G/DL (ref 12–16)
IMM GRANULOCYTES # BLD AUTO: 0.02 K/UL (ref 0–0.04)
IMM GRANULOCYTES NFR BLD AUTO: 0.4 % (ref 0–0.5)
IRON SATN MFR SERPL: 14 % (ref 20–50)
IRON SERPL-MCNC: 60 UG/DL (ref 30–160)
LYMPHOCYTES # BLD AUTO: 1.4 K/UL (ref 1–4.8)
LYMPHOCYTES NFR BLD: 29.2 % (ref 18–48)
MCH RBC QN AUTO: 26.1 PG (ref 27–31)
MCHC RBC AUTO-ENTMCNC: 31.1 G/DL (ref 32–36)
MCV RBC AUTO: 84 FL (ref 82–98)
MONOCYTES # BLD AUTO: 0.6 K/UL (ref 0.3–1)
MONOCYTES NFR BLD: 11.8 % (ref 4–15)
NEUTROPHILS # BLD AUTO: 2.6 K/UL (ref 1.8–7.7)
NEUTROPHILS NFR BLD: 54.9 % (ref 38–73)
NRBC BLD-RTO: 0 /100 WBC
PLATELET # BLD AUTO: 298 K/UL (ref 150–350)
PMV BLD AUTO: 9.9 FL (ref 9.2–12.9)
RBC # BLD AUTO: 3.71 M/UL (ref 4–5.4)
TOTAL IRON BINDING CAPACITY: 428 UG/DL (ref 265–497)
WBC # BLD AUTO: 4.76 K/UL (ref 3.9–12.7)

## 2019-04-30 PROCEDURE — 99214 OFFICE O/P EST MOD 30 MIN: CPT | Mod: S$PBB,,, | Performed by: NURSE PRACTITIONER

## 2019-04-30 PROCEDURE — 99214 PR OFFICE/OUTPT VISIT, EST, LEVL IV, 30-39 MIN: ICD-10-PCS | Mod: S$PBB,,, | Performed by: NURSE PRACTITIONER

## 2019-04-30 PROCEDURE — 85025 COMPLETE CBC W/AUTO DIFF WBC: CPT | Mod: PN

## 2019-04-30 PROCEDURE — 83540 ASSAY OF IRON: CPT

## 2019-04-30 PROCEDURE — 99999 PR PBB SHADOW E&M-EST. PATIENT-LVL III: ICD-10-PCS | Mod: PBBFAC,,, | Performed by: NURSE PRACTITIONER

## 2019-04-30 PROCEDURE — 99999 PR PBB SHADOW E&M-EST. PATIENT-LVL III: CPT | Mod: PBBFAC,,, | Performed by: NURSE PRACTITIONER

## 2019-04-30 PROCEDURE — 83540 ASSAY OF IRON: CPT | Mod: PN

## 2019-04-30 PROCEDURE — 82728 ASSAY OF FERRITIN: CPT | Mod: PN

## 2019-04-30 PROCEDURE — 82728 ASSAY OF FERRITIN: CPT

## 2019-04-30 PROCEDURE — 99213 OFFICE O/P EST LOW 20 MIN: CPT | Mod: PBBFAC,PN | Performed by: NURSE PRACTITIONER

## 2019-04-30 PROCEDURE — 85025 COMPLETE CBC W/AUTO DIFF WBC: CPT

## 2019-04-30 PROCEDURE — 36415 COLL VENOUS BLD VENIPUNCTURE: CPT | Mod: PN

## 2019-04-30 PROCEDURE — 82270 OCCULT BLOOD FECES: CPT | Mod: PBBFAC,PN | Performed by: NURSE PRACTITIONER

## 2019-04-30 RX ORDER — PREDNISONE 20 MG/1
TABLET ORAL
Refills: 3 | COMMUNITY
Start: 2019-02-11 | End: 2019-04-30

## 2019-04-30 RX ORDER — SULFAMETHOXAZOLE AND TRIMETHOPRIM 800; 160 MG/1; MG/1
TABLET ORAL
Refills: 0 | COMMUNITY
Start: 2019-03-09 | End: 2019-04-30

## 2019-04-30 RX ORDER — ALBUTEROL SULFATE 90 UG/1
AEROSOL, METERED RESPIRATORY (INHALATION)
Refills: 0 | COMMUNITY
Start: 2019-04-22 | End: 2019-04-30

## 2019-04-30 RX ORDER — ONDANSETRON HYDROCHLORIDE 8 MG/1
TABLET, FILM COATED ORAL
Refills: 1 | COMMUNITY
Start: 2019-03-14 | End: 2022-05-02

## 2019-04-30 NOTE — PROGRESS NOTES
HISTORY OF PRESENT ILLNESS:  The patient is a 68-year-old white female   well known to Dr. Santiago for iron deficiency anemia.  Last labs obtained were   in 2005 in which she was normal.  Her , a retired physician, ordered   labs for evaluation.  Her hemoglobin was found to be 6.7.  She was asymptomatic.    She carried on daily activities as well as watching young grandchildren without   a problem.  Iron studies were depleted and stools were negative for blood.    She was infused with Feraheme in December 2017 & January 2018.      She presents to the clinic today with her  for interval evaluation.    She continues to stay active.  She denies any dyspnea, chest pain, irregular   heart rate, weakness, fatigue, bleeding, pica, cold hands/feet, melena, etc.    No other new complaints or pertinent findings on a 10 point review of systems.    PHYSICAL EXAMINATION:  GENERAL:  Well-developed, well-nourished elderly white female in no acute   distress.  Alert & oriented x 3.  VITAL SIGNS:  Weight:  Loss 3 pounds in 6 months  Wt Readings from Last 3 Encounters:   04/30/19 65.6 kg (144 lb 10 oz)   10/31/18 67 kg (147 lb 11.3 oz)   04/30/18 62.9 kg (138 lb 10.7 oz)     Temp Readings from Last 3 Encounters:   04/30/19 98.1 °F (36.7 °C)   10/31/18 98.5 °F (36.9 °C)   04/30/18 98 °F (36.7 °C)     BP Readings from Last 3 Encounters:   04/30/19 107/69   10/31/18 113/71   04/30/18 114/63     Pulse Readings from Last 3 Encounters:   04/30/19 (!) 58   10/31/18 64   04/30/18 62     HEENT:  Normocephalic, atraumatic.  Oral mucosa pink and moist.  Lips without   lesions.  Tongue midline.  Oropharynx clear.  Nonicteric sclerae.   NECK:  Supple.  No adenopathy.                                               HEART:  Regular rate and rhythm without murmur, gallop or rub.               LUNGS:  Clear to auscultation bilaterally.                                   ABDOMEN:  Soft, nontender and nondistended with positive normoactive  bowel   sounds.  No hepatosplenomegaly.                                              EXTREMITIES:  No cyanosis, clubbing or edema.  Distal pulses are intact.       LABORATORY:   Lab Results   Component Value Date    WBC 4.76 04/30/2019    HGB 9.7 (L) 04/30/2019    HCT 31.2 (L) 04/30/2019    MCV 84 04/30/2019     04/30/2019     Declining hemoglobin 9.7 (11.2, 12.0, 10.8, 6.7)    IMPRESSION:  Iron deficiency anemia -- s/p Feraheme, asymptomatic; declining Hgb    PLAN:    1.  Due to declining hemoglobin, we will collect stool cards & obtain Iron studies/ferritin/sTFR today.  2.  Feraheme plan submitted for authorization for possible Iron infusion.  3.  Phone review test results.    Assessment/plan reviewed and approved by Dr. Cartwright.

## 2019-05-01 LAB — STFR SERPL-MCNC: 5.6 MG/L (ref 1.8–4.6)

## 2019-05-06 ENCOUNTER — PATIENT MESSAGE (OUTPATIENT)
Dept: HEMATOLOGY/ONCOLOGY | Facility: CLINIC | Age: 69
End: 2019-05-06

## 2019-05-06 LAB
CTP QC/QA: YES
FECAL OCCULT BLOOD, POC: NEGATIVE

## 2019-05-14 DIAGNOSIS — D50.9 IRON DEFICIENCY ANEMIA, UNSPECIFIED IRON DEFICIENCY ANEMIA TYPE: Primary | ICD-10-CM

## 2019-05-14 RX ORDER — SODIUM CHLORIDE 0.9 % (FLUSH) 0.9 %
10 SYRINGE (ML) INJECTION
Status: CANCELLED | OUTPATIENT
Start: 2019-05-14

## 2019-05-14 RX ORDER — HEPARIN 100 UNIT/ML
500 SYRINGE INTRAVENOUS
Status: CANCELLED | OUTPATIENT
Start: 2019-05-14

## 2019-05-14 NOTE — PROGRESS NOTES
Stool cards negative x 3.  Proceed with iron infusion.  Orders for Feraheme signed.    Ana M to check on authorization & schedule.  SHAWNA Quinones to arrange f/u in 3 weeks post 2nd infusion with interval CBC.

## 2019-05-15 ENCOUNTER — TELEPHONE (OUTPATIENT)
Dept: HEMATOLOGY/ONCOLOGY | Facility: CLINIC | Age: 69
End: 2019-05-15

## 2019-05-15 ENCOUNTER — DOCUMENTATION ONLY (OUTPATIENT)
Dept: INFUSION THERAPY | Facility: HOSPITAL | Age: 69
End: 2019-05-15

## 2019-05-15 NOTE — TELEPHONE ENCOUNTER
Please call and schedule se appointments per brijesh de la paz. Infusion is approved. Please let me know dates.

## 2019-05-15 NOTE — TELEPHONE ENCOUNTER
Pt's , Dr. Polo is requesting info on pt's plan.  Pt was seen for anemia, stools were negative.  Is she to be getting iron? Upon review of referrals, pt is awaiting insurance approval for iron infusions.  Pt's  would like to speak to nurse practitioner.  JYOTI Felix notified via this message.

## 2019-05-27 ENCOUNTER — INFUSION (OUTPATIENT)
Dept: INFUSION THERAPY | Facility: HOSPITAL | Age: 69
End: 2019-05-27
Attending: NURSE PRACTITIONER
Payer: MEDICARE

## 2019-05-27 VITALS
WEIGHT: 142.63 LBS | RESPIRATION RATE: 16 BRPM | SYSTOLIC BLOOD PRESSURE: 121 MMHG | HEART RATE: 68 BPM | OXYGEN SATURATION: 98 % | HEIGHT: 63 IN | BODY MASS INDEX: 25.27 KG/M2 | DIASTOLIC BLOOD PRESSURE: 73 MMHG | TEMPERATURE: 98 F

## 2019-05-27 DIAGNOSIS — D50.9 IRON DEFICIENCY ANEMIA, UNSPECIFIED IRON DEFICIENCY ANEMIA TYPE: Primary | ICD-10-CM

## 2019-05-27 PROCEDURE — 63600175 PHARM REV CODE 636 W HCPCS: Mod: JG,PN | Performed by: NURSE PRACTITIONER

## 2019-05-27 PROCEDURE — 25000003 PHARM REV CODE 250: Mod: PN | Performed by: NURSE PRACTITIONER

## 2019-05-27 PROCEDURE — 96374 THER/PROPH/DIAG INJ IV PUSH: CPT | Mod: PN

## 2019-05-27 RX ORDER — SODIUM CHLORIDE 0.9 % (FLUSH) 0.9 %
10 SYRINGE (ML) INJECTION
Status: DISCONTINUED | OUTPATIENT
Start: 2019-05-27 | End: 2019-05-27 | Stop reason: HOSPADM

## 2019-05-27 RX ORDER — HEPARIN 100 UNIT/ML
500 SYRINGE INTRAVENOUS
Status: CANCELLED | OUTPATIENT
Start: 2019-05-27

## 2019-05-27 RX ORDER — SODIUM CHLORIDE 0.9 % (FLUSH) 0.9 %
10 SYRINGE (ML) INJECTION
Status: CANCELLED | OUTPATIENT
Start: 2019-05-27

## 2019-05-27 RX ADMIN — SODIUM CHLORIDE: 900 INJECTION, SOLUTION INTRAVENOUS at 02:05

## 2019-05-27 RX ADMIN — FERUMOXYTOL 510 MG: 510 INJECTION INTRAVENOUS at 02:05

## 2019-05-27 NOTE — PLAN OF CARE
Problem: Activity Intolerance  Goal: Enhanced Capacity and Energy  Outcome: Ongoing (interventions implemented as appropriate)  TOLERATED TREATMENT WITHOUT DIFFICULTY.

## 2019-05-30 ENCOUNTER — INFUSION (OUTPATIENT)
Dept: INFUSION THERAPY | Facility: HOSPITAL | Age: 69
End: 2019-05-30
Attending: NURSE PRACTITIONER
Payer: MEDICARE

## 2019-05-30 VITALS
WEIGHT: 142.63 LBS | OXYGEN SATURATION: 99 % | TEMPERATURE: 98 F | SYSTOLIC BLOOD PRESSURE: 94 MMHG | HEART RATE: 63 BPM | DIASTOLIC BLOOD PRESSURE: 59 MMHG | BODY MASS INDEX: 25.27 KG/M2 | HEIGHT: 63 IN | RESPIRATION RATE: 17 BRPM

## 2019-05-30 DIAGNOSIS — D50.9 IRON DEFICIENCY ANEMIA, UNSPECIFIED IRON DEFICIENCY ANEMIA TYPE: Primary | ICD-10-CM

## 2019-05-30 PROCEDURE — A4216 STERILE WATER/SALINE, 10 ML: HCPCS | Mod: PN | Performed by: NURSE PRACTITIONER

## 2019-05-30 PROCEDURE — 25000003 PHARM REV CODE 250: Mod: PN | Performed by: NURSE PRACTITIONER

## 2019-05-30 PROCEDURE — 63600175 PHARM REV CODE 636 W HCPCS: Mod: JG,PN | Performed by: NURSE PRACTITIONER

## 2019-05-30 PROCEDURE — 96365 THER/PROPH/DIAG IV INF INIT: CPT | Mod: PN

## 2019-05-30 RX ORDER — SODIUM CHLORIDE 0.9 % (FLUSH) 0.9 %
10 SYRINGE (ML) INJECTION
Status: DISCONTINUED | OUTPATIENT
Start: 2019-05-30 | End: 2019-05-30 | Stop reason: HOSPADM

## 2019-05-30 RX ORDER — HEPARIN 100 UNIT/ML
500 SYRINGE INTRAVENOUS
Status: CANCELLED | OUTPATIENT
Start: 2019-05-30

## 2019-05-30 RX ORDER — SODIUM CHLORIDE 0.9 % (FLUSH) 0.9 %
10 SYRINGE (ML) INJECTION
Status: CANCELLED | OUTPATIENT
Start: 2019-05-30

## 2019-05-30 RX ADMIN — FERUMOXYTOL 510 MG: 510 INJECTION INTRAVENOUS at 02:05

## 2019-05-30 RX ADMIN — SODIUM CHLORIDE, PRESERVATIVE FREE 10 ML: 5 INJECTION INTRAVENOUS at 02:05

## 2019-05-30 RX ADMIN — SODIUM CHLORIDE: 9 INJECTION, SOLUTION INTRAVENOUS at 02:05

## 2019-05-30 NOTE — PLAN OF CARE
Problem: Adult Inpatient Plan of Care  Goal: Plan of Care Review  Outcome: Ongoing (interventions implemented as appropriate)  Pt. Completed treatment, tolerated without noted distress.Vtial signs stable. Patient discharged from infusion center ambulatory with . Patient had all present questions answered.

## 2019-06-20 ENCOUNTER — LAB VISIT (OUTPATIENT)
Dept: LAB | Facility: HOSPITAL | Age: 69
End: 2019-06-20
Attending: NURSE PRACTITIONER
Payer: MEDICARE

## 2019-06-20 ENCOUNTER — OFFICE VISIT (OUTPATIENT)
Dept: HEMATOLOGY/ONCOLOGY | Facility: CLINIC | Age: 69
End: 2019-06-20
Payer: MEDICARE

## 2019-06-20 VITALS
HEART RATE: 62 BPM | RESPIRATION RATE: 16 BRPM | OXYGEN SATURATION: 98 % | TEMPERATURE: 98 F | SYSTOLIC BLOOD PRESSURE: 96 MMHG | DIASTOLIC BLOOD PRESSURE: 64 MMHG | HEIGHT: 63 IN | WEIGHT: 141.31 LBS | BODY MASS INDEX: 25.04 KG/M2

## 2019-06-20 DIAGNOSIS — D50.9 IRON DEFICIENCY ANEMIA, UNSPECIFIED IRON DEFICIENCY ANEMIA TYPE: ICD-10-CM

## 2019-06-20 DIAGNOSIS — D50.9 IRON DEFICIENCY ANEMIA, UNSPECIFIED IRON DEFICIENCY ANEMIA TYPE: Primary | ICD-10-CM

## 2019-06-20 LAB
BASOPHILS # BLD AUTO: 0.03 K/UL (ref 0–0.2)
BASOPHILS NFR BLD: 0.7 % (ref 0–1.9)
DIFFERENTIAL METHOD: ABNORMAL
EOSINOPHIL # BLD AUTO: 0.1 K/UL (ref 0–0.5)
EOSINOPHIL NFR BLD: 2.2 % (ref 0–8)
ERYTHROCYTE [DISTWIDTH] IN BLOOD BY AUTOMATED COUNT: 21.5 % (ref 11.5–14.5)
HCT VFR BLD AUTO: 33.5 % (ref 37–48.5)
HGB BLD-MCNC: 10.6 G/DL (ref 12–16)
IMM GRANULOCYTES # BLD AUTO: 0.01 K/UL (ref 0–0.04)
IMM GRANULOCYTES NFR BLD AUTO: 0.2 % (ref 0–0.5)
LYMPHOCYTES # BLD AUTO: 1.1 K/UL (ref 1–4.8)
LYMPHOCYTES NFR BLD: 28.3 % (ref 18–48)
MCH RBC QN AUTO: 28.5 PG (ref 27–31)
MCHC RBC AUTO-ENTMCNC: 31.6 G/DL (ref 32–36)
MCV RBC AUTO: 90 FL (ref 82–98)
MONOCYTES # BLD AUTO: 0.3 K/UL (ref 0.3–1)
MONOCYTES NFR BLD: 8.2 % (ref 4–15)
NEUTROPHILS # BLD AUTO: 2.4 K/UL (ref 1.8–7.7)
NEUTROPHILS NFR BLD: 60.4 % (ref 38–73)
NRBC BLD-RTO: 0 /100 WBC
PLATELET # BLD AUTO: 268 K/UL (ref 150–350)
PMV BLD AUTO: 9.6 FL (ref 9.2–12.9)
RBC # BLD AUTO: 3.72 M/UL (ref 4–5.4)
WBC # BLD AUTO: 4.03 K/UL (ref 3.9–12.7)

## 2019-06-20 PROCEDURE — 99213 OFFICE O/P EST LOW 20 MIN: CPT | Mod: PBBFAC,PN | Performed by: NURSE PRACTITIONER

## 2019-06-20 PROCEDURE — 36415 COLL VENOUS BLD VENIPUNCTURE: CPT | Mod: PN

## 2019-06-20 PROCEDURE — 85025 COMPLETE CBC W/AUTO DIFF WBC: CPT | Mod: PN

## 2019-06-20 PROCEDURE — 99213 OFFICE O/P EST LOW 20 MIN: CPT | Mod: S$PBB,,, | Performed by: NURSE PRACTITIONER

## 2019-06-20 PROCEDURE — 99213 PR OFFICE/OUTPT VISIT, EST, LEVL III, 20-29 MIN: ICD-10-PCS | Mod: S$PBB,,, | Performed by: NURSE PRACTITIONER

## 2019-06-20 PROCEDURE — 99999 PR PBB SHADOW E&M-EST. PATIENT-LVL III: CPT | Mod: PBBFAC,,, | Performed by: NURSE PRACTITIONER

## 2019-06-20 PROCEDURE — 85025 COMPLETE CBC W/AUTO DIFF WBC: CPT

## 2019-06-20 PROCEDURE — 99999 PR PBB SHADOW E&M-EST. PATIENT-LVL III: ICD-10-PCS | Mod: PBBFAC,,, | Performed by: NURSE PRACTITIONER

## 2019-09-13 ENCOUNTER — LAB VISIT (OUTPATIENT)
Dept: LAB | Facility: HOSPITAL | Age: 69
End: 2019-09-13
Attending: NURSE PRACTITIONER
Payer: MEDICARE

## 2019-09-13 DIAGNOSIS — D50.9 IRON DEFICIENCY ANEMIA, UNSPECIFIED IRON DEFICIENCY ANEMIA TYPE: ICD-10-CM

## 2019-09-13 LAB
BASOPHILS # BLD AUTO: 0.03 K/UL (ref 0–0.2)
BASOPHILS NFR BLD: 0.4 % (ref 0–1.9)
DIFFERENTIAL METHOD: ABNORMAL
EOSINOPHIL # BLD AUTO: 0.1 K/UL (ref 0–0.5)
EOSINOPHIL NFR BLD: 0.8 % (ref 0–8)
ERYTHROCYTE [DISTWIDTH] IN BLOOD BY AUTOMATED COUNT: 13.4 % (ref 11.5–14.5)
FERRITIN SERPL-MCNC: 35 NG/ML (ref 20–300)
HCT VFR BLD AUTO: 38.7 % (ref 37–48.5)
HGB BLD-MCNC: 13.1 G/DL (ref 12–16)
IRON SERPL-MCNC: 125 UG/DL (ref 30–160)
LYMPHOCYTES # BLD AUTO: 1.5 K/UL (ref 1–4.8)
LYMPHOCYTES NFR BLD: 17.3 % (ref 18–48)
MCH RBC QN AUTO: 31.1 PG (ref 27–31)
MCHC RBC AUTO-ENTMCNC: 33.9 G/DL (ref 32–36)
MCV RBC AUTO: 92 FL (ref 82–98)
MONOCYTES # BLD AUTO: 0.5 K/UL (ref 0.3–1)
MONOCYTES NFR BLD: 6.2 % (ref 4–15)
NEUTROPHILS # BLD AUTO: 6.4 K/UL (ref 1.8–7.7)
NEUTROPHILS NFR BLD: 75.3 % (ref 38–73)
PLATELET # BLD AUTO: 289 K/UL (ref 150–350)
PMV BLD AUTO: 9 FL (ref 9.2–12.9)
RBC # BLD AUTO: 4.21 M/UL (ref 4–5.4)
SATURATED IRON: 28 % (ref 20–50)
TOTAL IRON BINDING CAPACITY: 447 UG/DL (ref 250–450)
TRANSFERRIN SERPL-MCNC: 302 MG/DL (ref 200–375)
WBC # BLD AUTO: 8.55 K/UL (ref 3.9–12.7)

## 2019-09-13 PROCEDURE — 83540 ASSAY OF IRON: CPT

## 2019-09-13 PROCEDURE — 85025 COMPLETE CBC W/AUTO DIFF WBC: CPT | Mod: PO

## 2019-09-13 PROCEDURE — 36415 COLL VENOUS BLD VENIPUNCTURE: CPT | Mod: PO

## 2019-09-13 PROCEDURE — 82728 ASSAY OF FERRITIN: CPT

## 2019-09-14 LAB — STFR SERPL-MCNC: 2.2 MG/L (ref 1.8–4.6)

## 2019-09-20 ENCOUNTER — OFFICE VISIT (OUTPATIENT)
Dept: HEMATOLOGY/ONCOLOGY | Facility: CLINIC | Age: 69
End: 2019-09-20
Payer: MEDICARE

## 2019-09-20 VITALS
BODY MASS INDEX: 25.66 KG/M2 | HEART RATE: 64 BPM | HEIGHT: 63 IN | SYSTOLIC BLOOD PRESSURE: 116 MMHG | RESPIRATION RATE: 16 BRPM | OXYGEN SATURATION: 99 % | WEIGHT: 144.81 LBS | DIASTOLIC BLOOD PRESSURE: 63 MMHG | TEMPERATURE: 98 F

## 2019-09-20 DIAGNOSIS — D50.9 IRON DEFICIENCY ANEMIA, UNSPECIFIED IRON DEFICIENCY ANEMIA TYPE: Primary | ICD-10-CM

## 2019-09-20 PROCEDURE — 99213 OFFICE O/P EST LOW 20 MIN: CPT | Mod: PBBFAC,PN | Performed by: NURSE PRACTITIONER

## 2019-09-20 PROCEDURE — 99213 PR OFFICE/OUTPT VISIT, EST, LEVL III, 20-29 MIN: ICD-10-PCS | Mod: S$PBB,,, | Performed by: NURSE PRACTITIONER

## 2019-09-20 PROCEDURE — 99999 PR PBB SHADOW E&M-EST. PATIENT-LVL III: ICD-10-PCS | Mod: PBBFAC,,, | Performed by: NURSE PRACTITIONER

## 2019-09-20 PROCEDURE — 99999 PR PBB SHADOW E&M-EST. PATIENT-LVL III: CPT | Mod: PBBFAC,,, | Performed by: NURSE PRACTITIONER

## 2019-09-20 PROCEDURE — 99213 OFFICE O/P EST LOW 20 MIN: CPT | Mod: S$PBB,,, | Performed by: NURSE PRACTITIONER

## 2020-01-13 NOTE — PROGRESS NOTES
Date of Service: 01/29/2018  A 67-year-old white female well known to me for iron deficiency anemia, who   completed an intravenous course of Feraheme and returns to clinic to review   results.  She tolerated infusions without difficulty.  No other pertinent   findings on a 10-point review of systems.    PHYSICAL EXAMINATION:  Unchanged from that performed on 01/08/2018.    LABORATORY:  White count 5.7, hemoglobin has improved from 6.7 to 10.8,   hematocrit is 35.3, MCV has improved from 67 to 80, platelets of 406.    IMPRESSION:  Iron deficiency anemia with excellent response to therapy.    PLAN:  1.  Reevaluate in three months with interval CBC.  2.  We will arrange for colonoscopy for the patient and her spouse with Dr. Lawler in [place].      SARAH/DERRICK  dd: 01/29/2018 13:33:51 (CST)  td: 01/30/2018 07:19:11 (CST)  Doc ID   #[id number]  Job ID #[id number]    CC:   
Dictation #1  MRN:[id number]  CSN:[id number]    
FEVER/CHILLS

## 2020-04-15 ENCOUNTER — TELEPHONE (OUTPATIENT)
Dept: HEMATOLOGY/ONCOLOGY | Facility: CLINIC | Age: 70
End: 2020-04-15

## 2020-04-15 NOTE — TELEPHONE ENCOUNTER
Call from patient's  who stated that patient was to have a repeat cbc in April to check counts and Walt Felix NP was to phone review the results. Lab was not scheduled.  He would like to have this done next week, but would like to know of Walt would like any other labs such as iron levels checked as well?   Scheduled lab for 4/22/20 in [place], but advised  that I will check with Walt and let him know her input as well and call him back.

## 2020-04-16 DIAGNOSIS — D50.9 IRON DEFICIENCY ANEMIA, UNSPECIFIED IRON DEFICIENCY ANEMIA TYPE: Primary | ICD-10-CM

## 2020-04-21 ENCOUNTER — PATIENT MESSAGE (OUTPATIENT)
Dept: HEMATOLOGY/ONCOLOGY | Facility: CLINIC | Age: 70
End: 2020-04-21

## 2020-04-21 NOTE — TELEPHONE ENCOUNTER
Per Walt Felix, NP - scheduled labs on 4/22/20 and a virtual call on Friday 4/24/20 with results, called  to inform of above, no answer, left detailed message on vm and send a message through Patient Portal

## 2020-04-22 ENCOUNTER — LAB VISIT (OUTPATIENT)
Dept: LAB | Facility: HOSPITAL | Age: 70
End: 2020-04-22
Attending: NURSE PRACTITIONER
Payer: MEDICARE

## 2020-04-22 DIAGNOSIS — D50.9 IRON DEFICIENCY ANEMIA, UNSPECIFIED IRON DEFICIENCY ANEMIA TYPE: ICD-10-CM

## 2020-04-22 LAB
BASOPHILS # BLD AUTO: 0.03 K/UL (ref 0–0.2)
BASOPHILS NFR BLD: 0.6 % (ref 0–1.9)
DIFFERENTIAL METHOD: ABNORMAL
EOSINOPHIL # BLD AUTO: 0.1 K/UL (ref 0–0.5)
EOSINOPHIL NFR BLD: 2.5 % (ref 0–8)
ERYTHROCYTE [DISTWIDTH] IN BLOOD BY AUTOMATED COUNT: 15.5 % (ref 11.5–14.5)
FERRITIN SERPL-MCNC: 8 NG/ML (ref 20–300)
HCT VFR BLD AUTO: 32.7 % (ref 37–48.5)
HGB BLD-MCNC: 10.4 G/DL (ref 12–16)
IRON SERPL-MCNC: 34 UG/DL (ref 30–160)
LYMPHOCYTES # BLD AUTO: 1.3 K/UL (ref 1–4.8)
LYMPHOCYTES NFR BLD: 27.5 % (ref 18–48)
MCH RBC QN AUTO: 27.2 PG (ref 27–31)
MCHC RBC AUTO-ENTMCNC: 31.8 G/DL (ref 32–36)
MCV RBC AUTO: 85 FL (ref 82–98)
MONOCYTES # BLD AUTO: 0.6 K/UL (ref 0.3–1)
MONOCYTES NFR BLD: 12.1 % (ref 4–15)
NEUTROPHILS # BLD AUTO: 2.8 K/UL (ref 1.8–7.7)
NEUTROPHILS NFR BLD: 57.3 % (ref 38–73)
PLATELET # BLD AUTO: 304 K/UL (ref 150–350)
PMV BLD AUTO: 9.7 FL (ref 9.2–12.9)
RBC # BLD AUTO: 3.83 M/UL (ref 4–5.4)
SATURATED IRON: 6 % (ref 20–50)
TOTAL IRON BINDING CAPACITY: 524 UG/DL (ref 250–450)
TRANSFERRIN SERPL-MCNC: 354 MG/DL (ref 200–375)
WBC # BLD AUTO: 4.87 K/UL (ref 3.9–12.7)

## 2020-04-22 PROCEDURE — 83540 ASSAY OF IRON: CPT

## 2020-04-22 PROCEDURE — 82728 ASSAY OF FERRITIN: CPT

## 2020-04-22 PROCEDURE — 36415 COLL VENOUS BLD VENIPUNCTURE: CPT | Mod: PO

## 2020-04-22 PROCEDURE — 85025 COMPLETE CBC W/AUTO DIFF WBC: CPT | Mod: PO

## 2020-04-24 ENCOUNTER — OFFICE VISIT (OUTPATIENT)
Dept: HEMATOLOGY/ONCOLOGY | Facility: CLINIC | Age: 70
End: 2020-04-24
Payer: MEDICARE

## 2020-04-24 DIAGNOSIS — D50.9 IRON DEFICIENCY ANEMIA, UNSPECIFIED IRON DEFICIENCY ANEMIA TYPE: Primary | ICD-10-CM

## 2020-04-24 PROCEDURE — 99442 PR PHYSICIAN TELEPHONE EVALUATION 11-20 MIN: ICD-10-PCS | Mod: 95,,, | Performed by: NURSE PRACTITIONER

## 2020-04-24 PROCEDURE — 99442 PR PHYSICIAN TELEPHONE EVALUATION 11-20 MIN: CPT | Mod: 95,,, | Performed by: NURSE PRACTITIONER

## 2020-04-24 RX ORDER — EPINEPHRINE 0.3 MG/.3ML
0.3 INJECTION SUBCUTANEOUS ONCE AS NEEDED
Status: CANCELLED | OUTPATIENT
Start: 2020-05-05

## 2020-04-24 RX ORDER — METHYLPREDNISOLONE SOD SUCC 125 MG
125 VIAL (EA) INJECTION ONCE AS NEEDED
Status: CANCELLED | OUTPATIENT
Start: 2020-05-05

## 2020-04-24 RX ORDER — DEXAMETHASONE SODIUM PHOSPHATE 4 MG/ML
4 INJECTION, SOLUTION INTRA-ARTICULAR; INTRALESIONAL; INTRAMUSCULAR; INTRAVENOUS; SOFT TISSUE ONCE
Status: CANCELLED
Start: 2020-05-05

## 2020-04-24 RX ORDER — FAMOTIDINE 10 MG/ML
20 INJECTION INTRAVENOUS
Status: CANCELLED
Start: 2020-05-05

## 2020-04-24 RX ORDER — DIPHENHYDRAMINE HYDROCHLORIDE 50 MG/ML
25 INJECTION INTRAMUSCULAR; INTRAVENOUS
Status: CANCELLED
Start: 2020-05-05

## 2020-04-24 RX ORDER — HEPARIN 100 UNIT/ML
500 SYRINGE INTRAVENOUS
Status: CANCELLED | OUTPATIENT
Start: 2020-05-05

## 2020-04-24 RX ORDER — SODIUM CHLORIDE 0.9 % (FLUSH) 0.9 %
10 SYRINGE (ML) INJECTION
Status: CANCELLED | OUTPATIENT
Start: 2020-05-05

## 2020-04-24 RX ORDER — DIPHENHYDRAMINE HYDROCHLORIDE 50 MG/ML
50 INJECTION INTRAMUSCULAR; INTRAVENOUS ONCE AS NEEDED
Status: CANCELLED | OUTPATIENT
Start: 2020-05-05

## 2020-04-24 RX ORDER — ALBUTEROL SULFATE 90 UG/1
AEROSOL, METERED RESPIRATORY (INHALATION)
COMMUNITY
Start: 2020-04-09 | End: 2022-05-02

## 2020-04-24 RX ORDER — ACYCLOVIR 400 MG/1
TABLET ORAL
COMMUNITY
Start: 2020-02-01 | End: 2022-05-02

## 2020-04-24 NOTE — Clinical Note
Set up Ferheme infusions at [facility].  F/U in 3 weeks with CBC prior with Dr. Santiago.

## 2020-04-24 NOTE — PROGRESS NOTES
Established Patient - Audio Only Telehealth Visit     The patient location is: Home  The chief complaint leading to consultation is: Review of lab results in regards to EDITA  Visit type: Virtual visit with audio only (telephone):  20 minutes     The reason for the audio only service rather than synchronous audio and video virtual visit was related to technical difficulties or patient preference/necessity.     Each patient to whom I provide medical services by telemedicine is:  (1) informed of the relationship between the physician and patient and the respective role of any other health care provider with respect to management of the patient; and (2) notified that they may decline to receive medical services by telemedicine and may withdraw from such care at any time. Patient verbally consented to receive this service via voice-only telephone call.    HPI: This is a 69 year old female known to Dr. Santiago for EDITA.  Initial Hgb was 6.7 without a bleeding source in 12/2017.  Her most recent Feraheme infusions were given in 05/2019.      She reports becoming increasingly more tired and fatigued with minimal exertion.  She denies any bleeding, chest pain, melena, pica, SOB, etc.  She would rather set up iron infusions than try oral iron a few months due to COVID-19.  She is requesting infusions to be done at [facility].  No other complaints or pertinent findings on a 10 point review of systems.     Laboratory:    Lab Results   Component Value Date    WBC 4.87 04/22/2020    RBC 3.83 (L) 04/22/2020    HGB 10.4 (L) 04/22/2020    HCT 32.7 (L) 04/22/2020    MCV 85 04/22/2020    MCH 27.2 04/22/2020    MCHC 31.8 (L) 04/22/2020    RDW 15.5 (H) 04/22/2020     04/22/2020    MPV 9.7 04/22/2020    GRAN 2.8 04/22/2020    GRAN 57.3 04/22/2020    LYMPH 1.3 04/22/2020    LYMPH 27.5 04/22/2020    MONO 0.6 04/22/2020    MONO 12.1 04/22/2020    EOS 0.1 04/22/2020    BASO 0.03 04/22/2020    EOSINOPHIL 2.5  04/22/2020    BASOPHIL 0.6 04/22/2020     Lab Results   Component Value Date    IRON 34 04/22/2020    TIBC 524 (H) 04/22/2020    FERRITIN 8 (L) 04/22/2020     sTFR - pending    Assessment and plan:  Arrange for Feraheme with premedication x 2 at [facility].  Follow up with Dr. Santiago in 3 weeks post 2nd infusion with CBC.    Assessment/plan reviewed and approved by Dr. Santiago.                        This service was not originating from a related E/M service provided within the previous 7 days nor will  to an E/M service or procedure within the next 24 hours or my soonest available appointment.  Prevailing standard of care was able to be met in this audio-only visit.

## 2020-04-27 LAB — STFR SERPL-MCNC: 5.2 MG/L (ref 1.9–4.4)

## 2020-04-28 ENCOUNTER — APPOINTMENT (OUTPATIENT)
Dept: INFUSION THERAPY | Facility: HOSPITAL | Age: 70
End: 2020-04-28
Attending: PHYSICIAN ASSISTANT
Payer: MEDICARE

## 2020-04-28 ENCOUNTER — TELEPHONE (OUTPATIENT)
Dept: INFUSION THERAPY | Facility: HOSPITAL | Age: 70
End: 2020-04-28

## 2020-04-28 DIAGNOSIS — Z03.818 ENCOUNTER FOR OBSERVATION FOR SUSPECTED EXPOSURE TO OTHER BIOLOGICAL AGENTS RULED OUT: Primary | ICD-10-CM

## 2020-04-28 PROCEDURE — U0002 COVID-19 LAB TEST NON-CDC: HCPCS

## 2020-04-29 ENCOUNTER — TELEPHONE (OUTPATIENT)
Dept: HEMATOLOGY/ONCOLOGY | Facility: CLINIC | Age: 70
End: 2020-04-29

## 2020-04-29 LAB — SARS-COV-2 RNA RESP QL NAA+PROBE: NOT DETECTED

## 2020-04-29 NOTE — TELEPHONE ENCOUNTER
----- Message from Enid Lacy sent at 4/29/2020  2:44 PM CDT -----   Type:  Sooner Apoointment Request    Caller is requesting a sooner appointment.  Caller declined first available appointment listed below.  Caller will not accept being placed on the waitlist and is requesting a message be sent to doctor.    Name of Caller: pt  When is the first available appointment?   Calling to have  Lab  Wk   Done at a  Different place than  Pt  Scheduled  //   [facility]   [place]   Best Call Back Number: [phone]   Additional Information:    Please call  For details

## 2020-04-29 NOTE — TELEPHONE ENCOUNTER
S/w Lavern.  Lab to be done at [facility] on the morning of her VV with Dr. ABDIEL Barger agreement with plan.

## 2020-05-01 ENCOUNTER — INFUSION (OUTPATIENT)
Dept: INFUSION THERAPY | Facility: HOSPITAL | Age: 70
End: 2020-05-01
Attending: NURSE PRACTITIONER
Payer: MEDICARE

## 2020-05-01 VITALS
DIASTOLIC BLOOD PRESSURE: 71 MMHG | SYSTOLIC BLOOD PRESSURE: 106 MMHG | TEMPERATURE: 99 F | RESPIRATION RATE: 18 BRPM | HEART RATE: 59 BPM | WEIGHT: 144.38 LBS | HEIGHT: 63 IN | OXYGEN SATURATION: 96 % | BODY MASS INDEX: 25.58 KG/M2

## 2020-05-01 DIAGNOSIS — D50.9 IRON DEFICIENCY ANEMIA, UNSPECIFIED IRON DEFICIENCY ANEMIA TYPE: Primary | ICD-10-CM

## 2020-05-01 PROCEDURE — 96367 TX/PROPH/DG ADDL SEQ IV INF: CPT

## 2020-05-01 PROCEDURE — S0028 INJECTION, FAMOTIDINE, 20 MG: HCPCS | Performed by: NURSE PRACTITIONER

## 2020-05-01 PROCEDURE — 96365 THER/PROPH/DIAG IV INF INIT: CPT

## 2020-05-01 PROCEDURE — 96375 TX/PRO/DX INJ NEW DRUG ADDON: CPT

## 2020-05-01 PROCEDURE — 63600175 PHARM REV CODE 636 W HCPCS: Performed by: NURSE PRACTITIONER

## 2020-05-01 PROCEDURE — 25000003 PHARM REV CODE 250: Performed by: NURSE PRACTITIONER

## 2020-05-01 RX ORDER — DEXAMETHASONE SODIUM PHOSPHATE 4 MG/ML
4 INJECTION, SOLUTION INTRA-ARTICULAR; INTRALESIONAL; INTRAMUSCULAR; INTRAVENOUS; SOFT TISSUE ONCE
Status: CANCELLED
Start: 2020-05-01

## 2020-05-01 RX ORDER — SODIUM CHLORIDE 0.9 % (FLUSH) 0.9 %
10 SYRINGE (ML) INJECTION
Status: DISCONTINUED | OUTPATIENT
Start: 2020-05-01 | End: 2020-05-01 | Stop reason: HOSPADM

## 2020-05-01 RX ORDER — DEXAMETHASONE SODIUM PHOSPHATE 4 MG/ML
4 INJECTION, SOLUTION INTRA-ARTICULAR; INTRALESIONAL; INTRAMUSCULAR; INTRAVENOUS; SOFT TISSUE ONCE
Status: DISCONTINUED | OUTPATIENT
Start: 2020-05-01 | End: 2020-05-01 | Stop reason: SDUPTHER

## 2020-05-01 RX ORDER — EPINEPHRINE 0.3 MG/.3ML
0.3 INJECTION SUBCUTANEOUS ONCE AS NEEDED
Status: CANCELLED | OUTPATIENT
Start: 2020-05-01

## 2020-05-01 RX ORDER — DIPHENHYDRAMINE HYDROCHLORIDE 50 MG/ML
25 INJECTION INTRAMUSCULAR; INTRAVENOUS
Status: CANCELLED
Start: 2020-05-01

## 2020-05-01 RX ORDER — DIPHENHYDRAMINE HYDROCHLORIDE 50 MG/ML
50 INJECTION INTRAMUSCULAR; INTRAVENOUS ONCE AS NEEDED
Status: CANCELLED | OUTPATIENT
Start: 2020-05-01

## 2020-05-01 RX ORDER — FAMOTIDINE 10 MG/ML
20 INJECTION INTRAVENOUS
Status: COMPLETED | OUTPATIENT
Start: 2020-05-01 | End: 2020-05-01

## 2020-05-01 RX ORDER — SODIUM CHLORIDE 0.9 % (FLUSH) 0.9 %
10 SYRINGE (ML) INJECTION
Status: CANCELLED | OUTPATIENT
Start: 2020-05-01

## 2020-05-01 RX ORDER — HEPARIN 100 UNIT/ML
500 SYRINGE INTRAVENOUS
Status: CANCELLED | OUTPATIENT
Start: 2020-05-01

## 2020-05-01 RX ORDER — METHYLPREDNISOLONE SOD SUCC 125 MG
125 VIAL (EA) INJECTION ONCE AS NEEDED
Status: CANCELLED | OUTPATIENT
Start: 2020-05-01

## 2020-05-01 RX ORDER — FAMOTIDINE 10 MG/ML
20 INJECTION INTRAVENOUS
Status: CANCELLED
Start: 2020-05-01

## 2020-05-01 RX ADMIN — DEXAMETHASONE SODIUM PHOSPHATE: 4 INJECTION, SOLUTION INTRA-ARTICULAR; INTRALESIONAL; INTRAMUSCULAR; INTRAVENOUS; SOFT TISSUE at 09:05

## 2020-05-01 RX ADMIN — DIPHENHYDRAMINE HYDROCHLORIDE 25 MG: 50 INJECTION INTRAMUSCULAR; INTRAVENOUS at 09:05

## 2020-05-01 RX ADMIN — FAMOTIDINE 20 MG: 10 INJECTION INTRAVENOUS at 09:05

## 2020-05-01 RX ADMIN — FERUMOXYTOL 510 MG: 510 INJECTION INTRAVENOUS at 10:05

## 2020-05-08 ENCOUNTER — INFUSION (OUTPATIENT)
Dept: INFUSION THERAPY | Facility: HOSPITAL | Age: 70
End: 2020-05-08
Attending: NURSE PRACTITIONER
Payer: MEDICARE

## 2020-05-08 VITALS
HEIGHT: 63 IN | BODY MASS INDEX: 25.65 KG/M2 | TEMPERATURE: 99 F | DIASTOLIC BLOOD PRESSURE: 70 MMHG | SYSTOLIC BLOOD PRESSURE: 103 MMHG | WEIGHT: 144.75 LBS | HEART RATE: 66 BPM | RESPIRATION RATE: 18 BRPM

## 2020-05-08 DIAGNOSIS — D50.9 IRON DEFICIENCY ANEMIA, UNSPECIFIED IRON DEFICIENCY ANEMIA TYPE: Primary | ICD-10-CM

## 2020-05-08 PROCEDURE — 96375 TX/PRO/DX INJ NEW DRUG ADDON: CPT

## 2020-05-08 PROCEDURE — S0028 INJECTION, FAMOTIDINE, 20 MG: HCPCS | Performed by: NURSE PRACTITIONER

## 2020-05-08 PROCEDURE — 25000003 PHARM REV CODE 250: Performed by: NURSE PRACTITIONER

## 2020-05-08 PROCEDURE — 96367 TX/PROPH/DG ADDL SEQ IV INF: CPT

## 2020-05-08 PROCEDURE — 96365 THER/PROPH/DIAG IV INF INIT: CPT

## 2020-05-08 PROCEDURE — 63600175 PHARM REV CODE 636 W HCPCS: Performed by: NURSE PRACTITIONER

## 2020-05-08 RX ORDER — EPINEPHRINE 0.3 MG/.3ML
0.3 INJECTION SUBCUTANEOUS ONCE AS NEEDED
Status: CANCELLED | OUTPATIENT
Start: 2020-05-08

## 2020-05-08 RX ORDER — FAMOTIDINE 10 MG/ML
20 INJECTION INTRAVENOUS
Status: COMPLETED | OUTPATIENT
Start: 2020-05-08 | End: 2020-05-08

## 2020-05-08 RX ORDER — HEPARIN 100 UNIT/ML
500 SYRINGE INTRAVENOUS
Status: CANCELLED | OUTPATIENT
Start: 2020-05-08

## 2020-05-08 RX ORDER — DEXAMETHASONE SODIUM PHOSPHATE 4 MG/ML
4 INJECTION, SOLUTION INTRA-ARTICULAR; INTRALESIONAL; INTRAMUSCULAR; INTRAVENOUS; SOFT TISSUE ONCE
Status: CANCELLED
Start: 2020-05-08

## 2020-05-08 RX ORDER — SODIUM CHLORIDE 0.9 % (FLUSH) 0.9 %
10 SYRINGE (ML) INJECTION
Status: DISCONTINUED | OUTPATIENT
Start: 2020-05-08 | End: 2020-05-08 | Stop reason: HOSPADM

## 2020-05-08 RX ORDER — SODIUM CHLORIDE 0.9 % (FLUSH) 0.9 %
10 SYRINGE (ML) INJECTION
Status: CANCELLED | OUTPATIENT
Start: 2020-05-08

## 2020-05-08 RX ORDER — METHYLPREDNISOLONE SOD SUCC 125 MG
125 VIAL (EA) INJECTION ONCE AS NEEDED
Status: CANCELLED | OUTPATIENT
Start: 2020-05-08

## 2020-05-08 RX ORDER — DIPHENHYDRAMINE HYDROCHLORIDE 50 MG/ML
25 INJECTION INTRAMUSCULAR; INTRAVENOUS
Status: CANCELLED
Start: 2020-05-08

## 2020-05-08 RX ORDER — DIPHENHYDRAMINE HYDROCHLORIDE 50 MG/ML
50 INJECTION INTRAMUSCULAR; INTRAVENOUS ONCE AS NEEDED
Status: CANCELLED | OUTPATIENT
Start: 2020-05-08

## 2020-05-08 RX ORDER — FAMOTIDINE 10 MG/ML
20 INJECTION INTRAVENOUS
Status: CANCELLED
Start: 2020-05-08

## 2020-05-08 RX ADMIN — FERUMOXYTOL 510 MG: 510 INJECTION INTRAVENOUS at 10:05

## 2020-05-08 RX ADMIN — FAMOTIDINE 20 MG: 10 INJECTION, SOLUTION INTRAVENOUS at 10:05

## 2020-05-08 RX ADMIN — DIPHENHYDRAMINE HYDROCHLORIDE 25 MG: 50 INJECTION INTRAMUSCULAR; INTRAVENOUS at 10:05

## 2020-05-08 RX ADMIN — DEXAMETHASONE SODIUM PHOSPHATE: 4 INJECTION, SOLUTION INTRA-ARTICULAR; INTRALESIONAL; INTRAMUSCULAR; INTRAVENOUS; SOFT TISSUE at 10:05

## 2020-05-08 NOTE — PLAN OF CARE
Problem: Anemia  Goal: Anemia Symptom Improvement  Outcome: Ongoing, Progressing  Intervention: Monitor and Manage Anemia  Flowsheets (Taken 5/8/2020 4296)  Fatigue Management: frequent rest breaks encouraged; paced activity encouraged

## 2020-05-29 ENCOUNTER — PATIENT MESSAGE (OUTPATIENT)
Dept: HEMATOLOGY/ONCOLOGY | Facility: CLINIC | Age: 70
End: 2020-05-29

## 2020-06-01 ENCOUNTER — OFFICE VISIT (OUTPATIENT)
Dept: HEMATOLOGY/ONCOLOGY | Facility: CLINIC | Age: 70
End: 2020-06-01
Payer: MEDICARE

## 2020-06-01 ENCOUNTER — LAB VISIT (OUTPATIENT)
Dept: LAB | Facility: HOSPITAL | Age: 70
End: 2020-06-01
Attending: NURSE PRACTITIONER
Payer: MEDICARE

## 2020-06-01 DIAGNOSIS — D50.9 IRON DEFICIENCY ANEMIA, UNSPECIFIED IRON DEFICIENCY ANEMIA TYPE: Primary | ICD-10-CM

## 2020-06-01 DIAGNOSIS — D50.9 IRON DEFICIENCY ANEMIA, UNSPECIFIED IRON DEFICIENCY ANEMIA TYPE: ICD-10-CM

## 2020-06-01 LAB
BASOPHILS # BLD AUTO: 0.02 K/UL (ref 0–0.2)
BASOPHILS NFR BLD: 0.4 % (ref 0–1.9)
DIFFERENTIAL METHOD: ABNORMAL
EOSINOPHIL # BLD AUTO: 0.2 K/UL (ref 0–0.5)
EOSINOPHIL NFR BLD: 4.1 % (ref 0–8)
ERYTHROCYTE [DISTWIDTH] IN BLOOD BY AUTOMATED COUNT: 18.6 % (ref 11.5–14.5)
HCT VFR BLD AUTO: 38.6 % (ref 37–48.5)
HGB BLD-MCNC: 12 G/DL (ref 12–16)
IMM GRANULOCYTES # BLD AUTO: 0.01 K/UL (ref 0–0.04)
IMM GRANULOCYTES NFR BLD AUTO: 0.2 % (ref 0–0.5)
LYMPHOCYTES # BLD AUTO: 1.3 K/UL (ref 1–4.8)
LYMPHOCYTES NFR BLD: 28.6 % (ref 18–48)
MCH RBC QN AUTO: 29.1 PG (ref 27–31)
MCHC RBC AUTO-ENTMCNC: 31.1 G/DL (ref 32–36)
MCV RBC AUTO: 94 FL (ref 82–98)
MONOCYTES # BLD AUTO: 0.4 K/UL (ref 0.3–1)
MONOCYTES NFR BLD: 8.8 % (ref 4–15)
NEUTROPHILS # BLD AUTO: 2.7 K/UL (ref 1.8–7.7)
NEUTROPHILS NFR BLD: 57.9 % (ref 38–73)
NRBC BLD-RTO: 0 /100 WBC
PLATELET # BLD AUTO: 243 K/UL (ref 150–350)
PMV BLD AUTO: 9.7 FL (ref 9.2–12.9)
RBC # BLD AUTO: 4.13 M/UL (ref 4–5.4)
WBC # BLD AUTO: 4.65 K/UL (ref 3.9–12.7)

## 2020-06-01 PROCEDURE — 99213 OFFICE O/P EST LOW 20 MIN: CPT | Mod: 95,,, | Performed by: INTERNAL MEDICINE

## 2020-06-01 PROCEDURE — 36415 COLL VENOUS BLD VENIPUNCTURE: CPT

## 2020-06-01 PROCEDURE — 99213 PR OFFICE/OUTPT VISIT, EST, LEVL III, 20-29 MIN: ICD-10-PCS | Mod: 95,,, | Performed by: INTERNAL MEDICINE

## 2020-06-01 PROCEDURE — 85025 COMPLETE CBC W/AUTO DIFF WBC: CPT

## 2020-09-01 ENCOUNTER — LAB VISIT (OUTPATIENT)
Dept: LAB | Facility: HOSPITAL | Age: 70
End: 2020-09-01
Attending: INTERNAL MEDICINE
Payer: MEDICARE

## 2020-09-01 DIAGNOSIS — D50.9 IRON DEFICIENCY ANEMIA, UNSPECIFIED IRON DEFICIENCY ANEMIA TYPE: ICD-10-CM

## 2020-09-01 LAB
BASOPHILS # BLD AUTO: 0.04 K/UL (ref 0–0.2)
BASOPHILS NFR BLD: 0.6 % (ref 0–1.9)
DIFFERENTIAL METHOD: ABNORMAL
EOSINOPHIL # BLD AUTO: 0.1 K/UL (ref 0–0.5)
EOSINOPHIL NFR BLD: 1.6 % (ref 0–8)
ERYTHROCYTE [DISTWIDTH] IN BLOOD BY AUTOMATED COUNT: 12.6 % (ref 11.5–14.5)
HCT VFR BLD AUTO: 35.9 % (ref 37–48.5)
HGB BLD-MCNC: 11.3 G/DL (ref 12–16)
IMM GRANULOCYTES # BLD AUTO: 0.02 K/UL (ref 0–0.04)
IMM GRANULOCYTES NFR BLD AUTO: 0.3 % (ref 0–0.5)
LYMPHOCYTES # BLD AUTO: 1.6 K/UL (ref 1–4.8)
LYMPHOCYTES NFR BLD: 24.9 % (ref 18–48)
MCH RBC QN AUTO: 31 PG (ref 27–31)
MCHC RBC AUTO-ENTMCNC: 31.5 G/DL (ref 32–36)
MCV RBC AUTO: 98 FL (ref 82–98)
MONOCYTES # BLD AUTO: 0.7 K/UL (ref 0.3–1)
MONOCYTES NFR BLD: 10.2 % (ref 4–15)
NEUTROPHILS # BLD AUTO: 4 K/UL (ref 1.8–7.7)
NEUTROPHILS NFR BLD: 62.4 % (ref 38–73)
NRBC BLD-RTO: 0 /100 WBC
PLATELET # BLD AUTO: 293 K/UL (ref 150–350)
PMV BLD AUTO: 9.7 FL (ref 9.2–12.9)
RBC # BLD AUTO: 3.65 M/UL (ref 4–5.4)
WBC # BLD AUTO: 6.35 K/UL (ref 3.9–12.7)

## 2020-09-01 PROCEDURE — 85025 COMPLETE CBC W/AUTO DIFF WBC: CPT

## 2020-09-01 PROCEDURE — 36415 COLL VENOUS BLD VENIPUNCTURE: CPT

## 2020-09-04 ENCOUNTER — OFFICE VISIT (OUTPATIENT)
Dept: HEMATOLOGY/ONCOLOGY | Facility: CLINIC | Age: 70
End: 2020-09-04
Payer: MEDICARE

## 2020-09-04 DIAGNOSIS — D50.9 IRON DEFICIENCY ANEMIA, UNSPECIFIED IRON DEFICIENCY ANEMIA TYPE: Primary | ICD-10-CM

## 2020-09-04 PROCEDURE — 99214 OFFICE O/P EST MOD 30 MIN: CPT | Mod: 95,,, | Performed by: INTERNAL MEDICINE

## 2020-09-04 PROCEDURE — 99214 PR OFFICE/OUTPT VISIT, EST, LEVL IV, 30-39 MIN: ICD-10-PCS | Mod: 95,,, | Performed by: INTERNAL MEDICINE

## 2020-09-04 NOTE — Clinical Note
Patient has recurring iron deficiency anemia and has never had endoscopy.  They would like referral to Dr. Hsieh for colonoscopy

## 2020-09-04 NOTE — Clinical Note
Patient needs initial screening colonoscopy.  They request Dr. Hsieh.  Review results of colonoscopy at earliest convenience.

## 2020-09-04 NOTE — PROGRESS NOTES
History of present illness:  The patient is a 69-year-old white female well known to me for iron deficiency anemia who recently completed 2nd course of intravenous iron replacement.  Patient had good response and improvement of symptoms related to anemia.  She returns via virtual visit platform to review interval CBC.  She denies any signs or symptoms of GI blood loss.  She is not experiencing weakness or dyspnea on exertion.  No other complaints for pertinent findings on a 14 point review systems.    Physical examination:  Well-developed, well-nourished, white female, in no acute distress, who is alert and oriented x4.  The remainder the patient's physical exam is deferred as this is a virtual encounter.    Laboratory:  White count 6.4, hemoglobin 11.3, hematocrit 35.9, platelets 293.  This represents a decrease in hemoglobin and hematocrit from a prior of 12.0 and 38.6.    Impression:  1.  Recurring iron deficiency anemia despite 2 prior courses of intravenous iron replacement.  2.  Patient has never undergone screening colonoscopy.    Plan:  1.  Will refer to Dr. Hsieh for colonoscopy and review results at earliest convenience.    This note was created using voice recognition software and may contain grammatical errors.

## 2020-09-09 ENCOUNTER — TELEPHONE (OUTPATIENT)
Dept: HEMATOLOGY/ONCOLOGY | Facility: CLINIC | Age: 70
End: 2020-09-09

## 2021-01-12 ENCOUNTER — IMMUNIZATION (OUTPATIENT)
Dept: FAMILY MEDICINE | Facility: CLINIC | Age: 71
End: 2021-01-12
Payer: MEDICARE

## 2021-01-12 DIAGNOSIS — Z23 NEED FOR VACCINATION: ICD-10-CM

## 2021-01-12 PROCEDURE — 91300 COVID-19, MRNA, LNP-S, PF, 30 MCG/0.3 ML DOSE VACCINE: CPT | Mod: PBBFAC,PO

## 2021-02-02 ENCOUNTER — IMMUNIZATION (OUTPATIENT)
Dept: FAMILY MEDICINE | Facility: CLINIC | Age: 71
End: 2021-02-02
Payer: MEDICARE

## 2021-02-02 DIAGNOSIS — Z23 NEED FOR VACCINATION: Primary | ICD-10-CM

## 2021-02-02 PROCEDURE — 0002A COVID-19, MRNA, LNP-S, PF, 30 MCG/0.3 ML DOSE VACCINE: CPT | Mod: PBBFAC,PO

## 2021-02-02 PROCEDURE — 91300 COVID-19, MRNA, LNP-S, PF, 30 MCG/0.3 ML DOSE VACCINE: CPT | Mod: PBBFAC,PO

## 2021-02-10 NOTE — TELEPHONE ENCOUNTER
Called Dr. Hsieh's office, spoke with Narda, gave patient's information and referral for initial screening colonoscopy. Rabia gave phone number on [phone] for patient to call to schedule.   Called patient, gave phone number to call to schedule initial screening colonoscopy   No

## 2021-03-17 ENCOUNTER — PATIENT MESSAGE (OUTPATIENT)
Dept: HEMATOLOGY/ONCOLOGY | Facility: CLINIC | Age: 71
End: 2021-03-17

## 2021-03-17 DIAGNOSIS — D50.9 IRON DEFICIENCY ANEMIA, UNSPECIFIED IRON DEFICIENCY ANEMIA TYPE: Primary | ICD-10-CM

## 2021-07-12 ENCOUNTER — PATIENT MESSAGE (OUTPATIENT)
Dept: HEMATOLOGY/ONCOLOGY | Facility: CLINIC | Age: 71
End: 2021-07-12

## 2021-07-13 DIAGNOSIS — D50.9 IRON DEFICIENCY ANEMIA, UNSPECIFIED IRON DEFICIENCY ANEMIA TYPE: Primary | ICD-10-CM

## 2021-07-14 ENCOUNTER — LAB VISIT (OUTPATIENT)
Dept: LAB | Facility: HOSPITAL | Age: 71
End: 2021-07-14
Attending: INTERNAL MEDICINE
Payer: MEDICARE

## 2021-07-14 DIAGNOSIS — D50.9 IRON DEFICIENCY ANEMIA, UNSPECIFIED IRON DEFICIENCY ANEMIA TYPE: ICD-10-CM

## 2021-07-14 LAB
ALBUMIN SERPL BCP-MCNC: 4 G/DL (ref 3.5–5.2)
ALP SERPL-CCNC: 79 U/L (ref 55–135)
ALT SERPL W/O P-5'-P-CCNC: 20 U/L (ref 10–44)
ANION GAP SERPL CALC-SCNC: 8 MMOL/L (ref 8–16)
AST SERPL-CCNC: 18 U/L (ref 10–40)
BASOPHILS # BLD AUTO: 0.04 K/UL (ref 0–0.2)
BASOPHILS NFR BLD: 0.9 % (ref 0–1.9)
BILIRUB SERPL-MCNC: 0.3 MG/DL (ref 0.1–1)
BUN SERPL-MCNC: 14 MG/DL (ref 8–23)
CALCIUM SERPL-MCNC: 9.9 MG/DL (ref 8.7–10.5)
CHLORIDE SERPL-SCNC: 104 MMOL/L (ref 95–110)
CO2 SERPL-SCNC: 25 MMOL/L (ref 23–29)
CREAT SERPL-MCNC: 0.8 MG/DL (ref 0.5–1.4)
DIFFERENTIAL METHOD: ABNORMAL
EOSINOPHIL # BLD AUTO: 0.1 K/UL (ref 0–0.5)
EOSINOPHIL NFR BLD: 1.8 % (ref 0–8)
ERYTHROCYTE [DISTWIDTH] IN BLOOD BY AUTOMATED COUNT: 13.9 % (ref 11.5–14.5)
EST. GFR  (AFRICAN AMERICAN): >60 ML/MIN/1.73 M^2
EST. GFR  (NON AFRICAN AMERICAN): >60 ML/MIN/1.73 M^2
GLUCOSE SERPL-MCNC: 108 MG/DL (ref 70–110)
HCT VFR BLD AUTO: 38 % (ref 37–48.5)
HGB BLD-MCNC: 12.6 G/DL (ref 12–16)
IMM GRANULOCYTES # BLD AUTO: 0.03 K/UL (ref 0–0.04)
IMM GRANULOCYTES NFR BLD AUTO: 0.7 % (ref 0–0.5)
LYMPHOCYTES # BLD AUTO: 1.1 K/UL (ref 1–4.8)
LYMPHOCYTES NFR BLD: 25.2 % (ref 18–48)
MCH RBC QN AUTO: 30.7 PG (ref 27–31)
MCHC RBC AUTO-ENTMCNC: 33.2 G/DL (ref 32–36)
MCV RBC AUTO: 93 FL (ref 82–98)
MONOCYTES # BLD AUTO: 0.5 K/UL (ref 0.3–1)
MONOCYTES NFR BLD: 11 % (ref 4–15)
NEUTROPHILS # BLD AUTO: 2.6 K/UL (ref 1.8–7.7)
NEUTROPHILS NFR BLD: 60.4 % (ref 38–73)
NRBC BLD-RTO: 0 /100 WBC
PLATELET # BLD AUTO: 278 K/UL (ref 150–450)
PMV BLD AUTO: 9.6 FL (ref 9.2–12.9)
POTASSIUM SERPL-SCNC: 4.2 MMOL/L (ref 3.5–5.1)
PROT SERPL-MCNC: 6.9 G/DL (ref 6–8.4)
RBC # BLD AUTO: 4.11 M/UL (ref 4–5.4)
SODIUM SERPL-SCNC: 137 MMOL/L (ref 136–145)
WBC # BLD AUTO: 4.37 K/UL (ref 3.9–12.7)

## 2021-07-14 PROCEDURE — 36415 COLL VENOUS BLD VENIPUNCTURE: CPT | Performed by: INTERNAL MEDICINE

## 2021-07-14 PROCEDURE — 80053 COMPREHEN METABOLIC PANEL: CPT | Performed by: INTERNAL MEDICINE

## 2021-07-14 PROCEDURE — 85025 COMPLETE CBC W/AUTO DIFF WBC: CPT | Performed by: INTERNAL MEDICINE

## 2021-07-16 ENCOUNTER — OFFICE VISIT (OUTPATIENT)
Dept: HEMATOLOGY/ONCOLOGY | Facility: CLINIC | Age: 71
End: 2021-07-16
Payer: MEDICARE

## 2021-07-16 DIAGNOSIS — D50.9 IRON DEFICIENCY ANEMIA, UNSPECIFIED IRON DEFICIENCY ANEMIA TYPE: Primary | ICD-10-CM

## 2021-07-16 PROCEDURE — 99213 PR OFFICE/OUTPT VISIT, EST, LEVL III, 20-29 MIN: ICD-10-PCS | Mod: 95,,, | Performed by: INTERNAL MEDICINE

## 2021-07-16 PROCEDURE — 99213 OFFICE O/P EST LOW 20 MIN: CPT | Mod: 95,,, | Performed by: INTERNAL MEDICINE

## 2021-07-16 RX ORDER — DIAZEPAM 5 MG/1
10 TABLET ORAL EVERY 8 HOURS PRN
Qty: 30 TABLET | Refills: 0 | Status: SHIPPED | OUTPATIENT
Start: 2021-07-16 | End: 2022-01-03 | Stop reason: SDUPTHER

## 2021-10-05 ENCOUNTER — IMMUNIZATION (OUTPATIENT)
Dept: PRIMARY CARE CLINIC | Facility: CLINIC | Age: 71
End: 2021-10-05
Payer: MEDICARE

## 2021-10-05 DIAGNOSIS — Z23 NEED FOR VACCINATION: Primary | ICD-10-CM

## 2021-10-05 PROCEDURE — 91300 COVID-19, MRNA, LNP-S, PF, 30 MCG/0.3 ML DOSE VACCINE: CPT | Mod: S$GLB,,, | Performed by: FAMILY MEDICINE

## 2021-10-05 PROCEDURE — 0003A COVID-19, MRNA, LNP-S, PF, 30 MCG/0.3 ML DOSE VACCINE: ICD-10-PCS | Mod: S$GLB,,, | Performed by: FAMILY MEDICINE

## 2021-10-05 PROCEDURE — 0003A COVID-19, MRNA, LNP-S, PF, 30 MCG/0.3 ML DOSE VACCINE: CPT | Mod: S$GLB,,, | Performed by: FAMILY MEDICINE

## 2021-10-05 PROCEDURE — 91300 COVID-19, MRNA, LNP-S, PF, 30 MCG/0.3 ML DOSE VACCINE: ICD-10-PCS | Mod: S$GLB,,, | Performed by: FAMILY MEDICINE

## 2021-11-22 ENCOUNTER — PATIENT MESSAGE (OUTPATIENT)
Dept: HEMATOLOGY/ONCOLOGY | Facility: CLINIC | Age: 71
End: 2021-11-22
Payer: MEDICARE

## 2021-12-27 ENCOUNTER — PATIENT MESSAGE (OUTPATIENT)
Dept: HEMATOLOGY/ONCOLOGY | Facility: CLINIC | Age: 71
End: 2021-12-27
Payer: MEDICARE

## 2021-12-27 ENCOUNTER — LAB VISIT (OUTPATIENT)
Dept: LAB | Facility: HOSPITAL | Age: 71
End: 2021-12-27
Attending: INTERNAL MEDICINE
Payer: MEDICARE

## 2021-12-27 DIAGNOSIS — D50.9 IRON DEFICIENCY ANEMIA, UNSPECIFIED IRON DEFICIENCY ANEMIA TYPE: ICD-10-CM

## 2021-12-27 LAB
BASOPHILS # BLD AUTO: 0.04 K/UL (ref 0–0.2)
BASOPHILS NFR BLD: 0.7 % (ref 0–1.9)
DIFFERENTIAL METHOD: ABNORMAL
EOSINOPHIL # BLD AUTO: 0.2 K/UL (ref 0–0.5)
EOSINOPHIL NFR BLD: 3 % (ref 0–8)
ERYTHROCYTE [DISTWIDTH] IN BLOOD BY AUTOMATED COUNT: 12.4 % (ref 11.5–14.5)
HCT VFR BLD AUTO: 35.2 % (ref 37–48.5)
HGB BLD-MCNC: 11.5 G/DL (ref 12–16)
IMM GRANULOCYTES # BLD AUTO: 0.01 K/UL (ref 0–0.04)
IMM GRANULOCYTES NFR BLD AUTO: 0.2 % (ref 0–0.5)
LYMPHOCYTES # BLD AUTO: 1.2 K/UL (ref 1–4.8)
LYMPHOCYTES NFR BLD: 21.3 % (ref 18–48)
MCH RBC QN AUTO: 32 PG (ref 27–31)
MCHC RBC AUTO-ENTMCNC: 32.7 G/DL (ref 32–36)
MCV RBC AUTO: 98 FL (ref 82–98)
MONOCYTES # BLD AUTO: 0.6 K/UL (ref 0.3–1)
MONOCYTES NFR BLD: 10.6 % (ref 4–15)
NEUTROPHILS # BLD AUTO: 3.6 K/UL (ref 1.8–7.7)
NEUTROPHILS NFR BLD: 64.2 % (ref 38–73)
NRBC BLD-RTO: 0 /100 WBC
PLATELET # BLD AUTO: 248 K/UL (ref 150–450)
PMV BLD AUTO: 9.6 FL (ref 9.2–12.9)
RBC # BLD AUTO: 3.59 M/UL (ref 4–5.4)
WBC # BLD AUTO: 5.59 K/UL (ref 3.9–12.7)

## 2021-12-27 PROCEDURE — 85025 COMPLETE CBC W/AUTO DIFF WBC: CPT | Performed by: INTERNAL MEDICINE

## 2021-12-27 PROCEDURE — 36415 COLL VENOUS BLD VENIPUNCTURE: CPT | Performed by: INTERNAL MEDICINE

## 2022-01-03 ENCOUNTER — OFFICE VISIT (OUTPATIENT)
Dept: HEMATOLOGY/ONCOLOGY | Facility: CLINIC | Age: 72
End: 2022-01-03
Payer: MEDICARE

## 2022-01-03 DIAGNOSIS — D50.9 IRON DEFICIENCY ANEMIA, UNSPECIFIED IRON DEFICIENCY ANEMIA TYPE: Primary | ICD-10-CM

## 2022-01-03 PROCEDURE — 99213 PR OFFICE/OUTPT VISIT, EST, LEVL III, 20-29 MIN: ICD-10-PCS | Mod: 95,,, | Performed by: INTERNAL MEDICINE

## 2022-01-03 PROCEDURE — 99213 OFFICE O/P EST LOW 20 MIN: CPT | Mod: 95,,, | Performed by: INTERNAL MEDICINE

## 2022-01-03 RX ORDER — DIAZEPAM 5 MG/1
10 TABLET ORAL EVERY 8 HOURS PRN
Qty: 30 TABLET | Refills: 0 | Status: SHIPPED | OUTPATIENT
Start: 2022-01-03 | End: 2022-05-05 | Stop reason: SDUPTHER

## 2022-01-03 NOTE — PROGRESS NOTES
History of present illness:  The patient is a 71-year-old white female well known to me for iron deficiency anemia who has completed 2 courses of intravenous iron replacement.  Patient had good response and improvement of symptoms related to anemia.  She returns via virtual visit platform to review interval CBC.  She denies any signs or symptoms of GI blood loss.  She is not experiencing weakness or dyspnea on exertion.  Interval colonoscopy did not reveal any source of bleeding.  Patient continues to experience some difficulties generalized anxiety and requests a refill on Valium.    Physical examination:  Well-developed, well-nourished, white female, in no acute distress, who is alert and oriented x4.  The remainder the patient's physical exam is deferred as this is a virtual encounter.    Laboratory:  White count 5.6, hemoglobin 11.5, hematocrit 35.2, platelets 248.     Impression:  1.  Recurring iron deficiency anemia despite 2 prior courses of intravenous iron replacement.  2. Generalized anxiety disorder.    Plan:  1. Return to clinic in 4 months with CBC.  2. Refill Valium per patient request.    This note was created using voice recognition software and may contain grammatical errors.     The patient location is:  home  The chief complaint leading to consultation is:  Iron deficiency anemia/generalized anxiety  Visit type: Virtual visit with synchronous audio and video  Total time spent with patient:  20 minutes  Each patient to whom he or she provides medical services by telemedicine is:  (1) informed of the relationship between the physician and patient and the respective role of any other health care provider with respect to management of the patient; and (2) notified that he or she may decline to receive medical services by telemedicine and may withdraw from such care at any time.    Notes:  See above

## 2022-04-20 ENCOUNTER — TELEPHONE (OUTPATIENT)
Dept: HEMATOLOGY/ONCOLOGY | Facility: CLINIC | Age: 72
End: 2022-04-20
Payer: MEDICARE

## 2022-04-20 NOTE — TELEPHONE ENCOUNTER
Spoke with pt to get appt moved from MD to NP and she asked if she could have a VV and verbalized acceptance

## 2022-04-29 ENCOUNTER — LAB VISIT (OUTPATIENT)
Dept: LAB | Facility: HOSPITAL | Age: 72
End: 2022-04-29
Attending: INTERNAL MEDICINE
Payer: MEDICARE

## 2022-04-29 DIAGNOSIS — D50.9 IRON DEFICIENCY ANEMIA, UNSPECIFIED IRON DEFICIENCY ANEMIA TYPE: ICD-10-CM

## 2022-04-29 LAB
BASOPHILS # BLD AUTO: 0.06 K/UL (ref 0–0.2)
BASOPHILS NFR BLD: 1.1 % (ref 0–1.9)
DIFFERENTIAL METHOD: ABNORMAL
EOSINOPHIL # BLD AUTO: 0.1 K/UL (ref 0–0.5)
EOSINOPHIL NFR BLD: 2.4 % (ref 0–8)
ERYTHROCYTE [DISTWIDTH] IN BLOOD BY AUTOMATED COUNT: 13.8 % (ref 11.5–14.5)
HCT VFR BLD AUTO: 27.2 % (ref 37–48.5)
HGB BLD-MCNC: 8.2 G/DL (ref 12–16)
IMM GRANULOCYTES # BLD AUTO: 0.01 K/UL (ref 0–0.04)
IMM GRANULOCYTES NFR BLD AUTO: 0.2 % (ref 0–0.5)
LYMPHOCYTES # BLD AUTO: 1.3 K/UL (ref 1–4.8)
LYMPHOCYTES NFR BLD: 23.5 % (ref 18–48)
MCH RBC QN AUTO: 24.8 PG (ref 27–31)
MCHC RBC AUTO-ENTMCNC: 30.1 G/DL (ref 32–36)
MCV RBC AUTO: 82 FL (ref 82–98)
MONOCYTES # BLD AUTO: 0.6 K/UL (ref 0.3–1)
MONOCYTES NFR BLD: 10.5 % (ref 4–15)
NEUTROPHILS # BLD AUTO: 3.4 K/UL (ref 1.8–7.7)
NEUTROPHILS NFR BLD: 62.3 % (ref 38–73)
NRBC BLD-RTO: 0 /100 WBC
PLATELET # BLD AUTO: 342 K/UL (ref 150–450)
PMV BLD AUTO: 9.4 FL (ref 9.2–12.9)
RBC # BLD AUTO: 3.31 M/UL (ref 4–5.4)
WBC # BLD AUTO: 5.44 K/UL (ref 3.9–12.7)

## 2022-04-29 PROCEDURE — 36415 COLL VENOUS BLD VENIPUNCTURE: CPT | Performed by: INTERNAL MEDICINE

## 2022-04-29 PROCEDURE — 85025 COMPLETE CBC W/AUTO DIFF WBC: CPT | Performed by: INTERNAL MEDICINE

## 2022-05-02 ENCOUNTER — OFFICE VISIT (OUTPATIENT)
Dept: HEMATOLOGY/ONCOLOGY | Facility: CLINIC | Age: 72
End: 2022-05-02
Payer: MEDICARE

## 2022-05-02 ENCOUNTER — LAB VISIT (OUTPATIENT)
Dept: LAB | Facility: HOSPITAL | Age: 72
End: 2022-05-02
Attending: NURSE PRACTITIONER
Payer: MEDICARE

## 2022-05-02 DIAGNOSIS — D50.0 IRON DEFICIENCY ANEMIA DUE TO CHRONIC BLOOD LOSS: ICD-10-CM

## 2022-05-02 DIAGNOSIS — D50.0 IRON DEFICIENCY ANEMIA DUE TO CHRONIC BLOOD LOSS: Primary | ICD-10-CM

## 2022-05-02 LAB
BASOPHILS # BLD AUTO: 0.04 K/UL (ref 0–0.2)
BASOPHILS NFR BLD: 0.7 % (ref 0–1.9)
DIFFERENTIAL METHOD: ABNORMAL
EOSINOPHIL # BLD AUTO: 0.1 K/UL (ref 0–0.5)
EOSINOPHIL NFR BLD: 1.2 % (ref 0–8)
ERYTHROCYTE [DISTWIDTH] IN BLOOD BY AUTOMATED COUNT: 13.9 % (ref 11.5–14.5)
FERRITIN SERPL-MCNC: 3 NG/ML (ref 20–300)
FERRITIN SERPL-MCNC: 3 NG/ML (ref 20–300)
HCT VFR BLD AUTO: 27.5 % (ref 37–48.5)
HGB BLD-MCNC: 8.3 G/DL (ref 12–16)
IMM GRANULOCYTES # BLD AUTO: 0.02 K/UL (ref 0–0.04)
IMM GRANULOCYTES NFR BLD AUTO: 0.3 % (ref 0–0.5)
IRON SERPL-MCNC: 27 UG/DL (ref 30–160)
IRON SERPL-MCNC: 27 UG/DL (ref 30–160)
LYMPHOCYTES # BLD AUTO: 1.1 K/UL (ref 1–4.8)
LYMPHOCYTES NFR BLD: 19.6 % (ref 18–48)
MCH RBC QN AUTO: 24.7 PG (ref 27–31)
MCHC RBC AUTO-ENTMCNC: 30.2 G/DL (ref 32–36)
MCV RBC AUTO: 82 FL (ref 82–98)
MONOCYTES # BLD AUTO: 0.5 K/UL (ref 0.3–1)
MONOCYTES NFR BLD: 8 % (ref 4–15)
NEUTROPHILS # BLD AUTO: 4.1 K/UL (ref 1.8–7.7)
NEUTROPHILS NFR BLD: 70.2 % (ref 38–73)
NRBC BLD-RTO: 0 /100 WBC
PLATELET # BLD AUTO: 321 K/UL (ref 150–450)
PMV BLD AUTO: 9.8 FL (ref 9.2–12.9)
RBC # BLD AUTO: 3.36 M/UL (ref 4–5.4)
SATURATED IRON: 5 % (ref 20–50)
SATURATED IRON: 5 % (ref 20–50)
TOTAL IRON BINDING CAPACITY: 564 UG/DL (ref 250–450)
TOTAL IRON BINDING CAPACITY: 564 UG/DL (ref 250–450)
TRANSFERRIN SERPL-MCNC: 381 MG/DL (ref 200–375)
TRANSFERRIN SERPL-MCNC: 381 MG/DL (ref 200–375)
WBC # BLD AUTO: 5.78 K/UL (ref 3.9–12.7)

## 2022-05-02 PROCEDURE — 99213 PR OFFICE/OUTPT VISIT, EST, LEVL III, 20-29 MIN: ICD-10-PCS | Mod: 95,,, | Performed by: NURSE PRACTITIONER

## 2022-05-02 PROCEDURE — 85025 COMPLETE CBC W/AUTO DIFF WBC: CPT | Performed by: NURSE PRACTITIONER

## 2022-05-02 PROCEDURE — 36415 COLL VENOUS BLD VENIPUNCTURE: CPT | Performed by: NURSE PRACTITIONER

## 2022-05-02 PROCEDURE — 82728 ASSAY OF FERRITIN: CPT | Performed by: NURSE PRACTITIONER

## 2022-05-02 PROCEDURE — 99213 OFFICE O/P EST LOW 20 MIN: CPT | Mod: 95,,, | Performed by: NURSE PRACTITIONER

## 2022-05-02 PROCEDURE — 84466 ASSAY OF TRANSFERRIN: CPT | Performed by: NURSE PRACTITIONER

## 2022-05-02 RX ORDER — DIPHENHYDRAMINE HYDROCHLORIDE 50 MG/ML
25 INJECTION INTRAMUSCULAR; INTRAVENOUS
Status: CANCELLED
Start: 2022-05-02

## 2022-05-02 RX ORDER — FAMOTIDINE 10 MG/ML
20 INJECTION INTRAVENOUS
Status: CANCELLED
Start: 2022-05-02 | End: 2022-05-02

## 2022-05-02 RX ORDER — METHYLPREDNISOLONE SOD SUCC 125 MG
125 VIAL (EA) INJECTION ONCE AS NEEDED
Status: CANCELLED | OUTPATIENT
Start: 2022-05-02

## 2022-05-02 RX ORDER — SODIUM CHLORIDE 0.9 % (FLUSH) 0.9 %
10 SYRINGE (ML) INJECTION
Status: CANCELLED | OUTPATIENT
Start: 2022-05-02

## 2022-05-02 RX ORDER — DIPHENHYDRAMINE HYDROCHLORIDE 50 MG/ML
50 INJECTION INTRAMUSCULAR; INTRAVENOUS ONCE AS NEEDED
Status: CANCELLED | OUTPATIENT
Start: 2022-05-02

## 2022-05-02 RX ORDER — DEXAMETHASONE SODIUM PHOSPHATE 4 MG/ML
4 INJECTION, SOLUTION INTRA-ARTICULAR; INTRALESIONAL; INTRAMUSCULAR; INTRAVENOUS; SOFT TISSUE
Status: CANCELLED
Start: 2022-05-02

## 2022-05-02 RX ORDER — HEPARIN 100 UNIT/ML
500 SYRINGE INTRAVENOUS
Status: CANCELLED | OUTPATIENT
Start: 2022-05-02

## 2022-05-02 RX ORDER — EPINEPHRINE 0.3 MG/.3ML
0.3 INJECTION SUBCUTANEOUS ONCE AS NEEDED
Status: CANCELLED | OUTPATIENT
Start: 2022-05-02

## 2022-05-02 NOTE — PROGRESS NOTES
The patient location is: Home  The chief complaint leading to consultation is: Review of labs    Visit type: audiovisual    Face to Face time with patient: 15 minutes of total time spent on the encounter, which includes face to face time and non-face to face time preparing to see the patient (eg, review of tests), Obtaining and/or reviewing separately obtained history, Documenting clinical information in the electronic or other health record, Independently interpreting results (not separately reported) and communicating results to the patient/family/caregiver, or Care coordination (not separately reported).     Each patient to whom he or she provides medical services by telemedicine is:  (1) informed of the relationship between the physician and patient and the respective role of any other health care provider with respect to management of the patient; and (2) notified that he or she may decline to receive medical services by telemedicine and may withdraw from such care at any time.    History of present illness:  The patient is a 71-year-old female of Dr. Santiago's known for iron deficiency anemia who has completed 2 courses of intravenous iron replacement (05/2019 & 05/2020).      She presents via VV with her  (MD) to review labs with complaints of increasing fatigue. Last infusion was 2 years ago.  No signs or symptoms of GI blood loss.  She denies experiencing weakness or dyspnea on exertion.   She continues to stay active and watch grandchildren.    Physical examination:  Well-developed, well-nourished, white female, in no acute distress, Alert and oriented x4.  Respiratory effort is normal.  Physical exam is deferred as this is a virtual encounter.    Lab Results   Component Value Date    WBC 5.44 04/29/2022    RBC 3.31 (L) 04/29/2022    HGB 8.2 (L) 04/29/2022    HCT 27.2 (L) 04/29/2022    MCV 82 04/29/2022    MCH 24.8 (L) 04/29/2022    MCHC 30.1 (L) 04/29/2022    RDW 13.8 04/29/2022      04/29/2022    MPV 9.4 04/29/2022    GRAN 3.4 04/29/2022    GRAN 62.3 04/29/2022    LYMPH 1.3 04/29/2022    LYMPH 23.5 04/29/2022    MONO 0.6 04/29/2022    MONO 10.5 04/29/2022    EOS 0.1 04/29/2022    BASO 0.06 04/29/2022    EOSINOPHIL 2.4 04/29/2022    BASOPHIL 1.1 04/29/2022     Impression:  1.  Recurring iron deficiency anemia - symptomatic post 2 years of last IV iron  2. Generalized anxiety disorder.    Plan:  1.  Obtain iron studies/ferritin today at [facility] on [address].  2.  Submit Feraheme x 2 with premedications; requesting infusion be done at infusion clinic on [facility].  3.  Follow up in 3 months (VV) with CBC, iron studies/ferritin prior.  4.  Call for any concerns.         Route Chart for Scheduling    Med Onc Chart Routing      Follow up with physician    Follow up with BRAYAN 3 months. Feraheme orders placed; patient wants infusion at [facility] clinic;  f/u in 3 months post infusion (may be VV) with CBC, Iron studies/ferritin prior (TBD at [facility] on [address])   Labs    Lab interval:  Patient needs Iron studies/ferritin lab today at [facility] on [address]   Imaging    Pharmacy appointment    Other referrals            Therapy Plan Information  Pre-Medications  dexamethasone injection 4 mg  4 mg, Intravenous, Every visit  famotidine (PF) injection 20 mg  20 mg, Intravenous, Every visit  diphenhydrAMINE injection 25 mg  25 mg, Intravenous, Every visit  Medications  FERUMOXYTOL 510 MG/117 ML D5W (READY TO MIX SYSTEM) IVPB 510 mg  510 mg, Intravenous, Every visit  Flushes  heparin, porcine (PF) 100 unit/mL injection flush 500 Units  500 Units, Intravenous, PRN  sodium chloride 0.9% flush 10 mL  10 mL, Intravenous, Every visit  sodium chloride 0.9% 100 mL flush bag  Intravenous, Every visit  PRN Medications  EPINEPHrine (EPIPEN) 0.3 mg/0.3 mL pen injection 0.3 mg  0.3 mg, Intramuscular, PRN  diphenhydrAMINE injection 50 mg  50 mg, Intravenous, PRN  methylPREDNISolone sodium succinate  injection 125 mg  125 mg, Intravenous, PRN  sodium chloride 0.9% bolus 1,000 mL  1,000 mL, Intravenous, PRN    Answers for HPI/ROS submitted by the patient on 4/27/2022  appetite change : No  unexpected weight change: No  mouth sores: No  visual disturbance: No  cough: No  shortness of breath: No  chest pain: No  abdominal pain: No  diarrhea: No  frequency: No  back pain: No  rash: No  headaches: No  adenopathy: No  nervous/ anxious: No

## 2022-05-05 ENCOUNTER — PATIENT MESSAGE (OUTPATIENT)
Dept: HEMATOLOGY/ONCOLOGY | Facility: CLINIC | Age: 72
End: 2022-05-05
Payer: MEDICARE

## 2022-05-05 DIAGNOSIS — D50.9 IRON DEFICIENCY ANEMIA, UNSPECIFIED IRON DEFICIENCY ANEMIA TYPE: ICD-10-CM

## 2022-05-05 RX ORDER — DIAZEPAM 5 MG/1
10 TABLET ORAL EVERY 8 HOURS PRN
Qty: 30 TABLET | Refills: 0 | Status: SHIPPED | OUTPATIENT
Start: 2022-05-05 | End: 2022-08-10 | Stop reason: SDUPTHER

## 2022-05-13 ENCOUNTER — INFUSION (OUTPATIENT)
Dept: INFUSION THERAPY | Facility: HOSPITAL | Age: 72
End: 2022-05-13
Attending: NURSE PRACTITIONER
Payer: MEDICARE

## 2022-05-13 VITALS
RESPIRATION RATE: 18 BRPM | BODY MASS INDEX: 25.83 KG/M2 | OXYGEN SATURATION: 100 % | TEMPERATURE: 97 F | SYSTOLIC BLOOD PRESSURE: 117 MMHG | DIASTOLIC BLOOD PRESSURE: 78 MMHG | WEIGHT: 145.81 LBS | HEART RATE: 55 BPM

## 2022-05-13 DIAGNOSIS — D50.9 IRON DEFICIENCY ANEMIA, UNSPECIFIED IRON DEFICIENCY ANEMIA TYPE: Primary | ICD-10-CM

## 2022-05-13 PROCEDURE — 96365 THER/PROPH/DIAG IV INF INIT: CPT

## 2022-05-13 PROCEDURE — 96375 TX/PRO/DX INJ NEW DRUG ADDON: CPT

## 2022-05-13 PROCEDURE — 25000003 PHARM REV CODE 250: Performed by: NURSE PRACTITIONER

## 2022-05-13 PROCEDURE — 63600175 PHARM REV CODE 636 W HCPCS: Performed by: NURSE PRACTITIONER

## 2022-05-13 PROCEDURE — 96367 TX/PROPH/DG ADDL SEQ IV INF: CPT

## 2022-05-13 RX ORDER — SODIUM CHLORIDE 0.9 % (FLUSH) 0.9 %
10 SYRINGE (ML) INJECTION
Status: CANCELLED | OUTPATIENT
Start: 2022-05-13

## 2022-05-13 RX ORDER — DEXAMETHASONE SODIUM PHOSPHATE 4 MG/ML
4 INJECTION, SOLUTION INTRA-ARTICULAR; INTRALESIONAL; INTRAMUSCULAR; INTRAVENOUS; SOFT TISSUE
Status: CANCELLED
Start: 2022-05-13

## 2022-05-13 RX ORDER — FAMOTIDINE 10 MG/ML
20 INJECTION INTRAVENOUS
Status: CANCELLED
Start: 2022-05-13 | End: 2022-05-13

## 2022-05-13 RX ORDER — DEXAMETHASONE SODIUM PHOSPHATE 4 MG/ML
4 INJECTION, SOLUTION INTRA-ARTICULAR; INTRALESIONAL; INTRAMUSCULAR; INTRAVENOUS; SOFT TISSUE
Status: DISCONTINUED | OUTPATIENT
Start: 2022-05-13 | End: 2022-05-13

## 2022-05-13 RX ORDER — METHYLPREDNISOLONE SOD SUCC 125 MG
125 VIAL (EA) INJECTION ONCE AS NEEDED
Status: CANCELLED | OUTPATIENT
Start: 2022-05-13

## 2022-05-13 RX ORDER — EPINEPHRINE 0.3 MG/.3ML
0.3 INJECTION SUBCUTANEOUS ONCE AS NEEDED
Status: CANCELLED | OUTPATIENT
Start: 2022-05-13

## 2022-05-13 RX ORDER — SODIUM CHLORIDE 0.9 % (FLUSH) 0.9 %
10 SYRINGE (ML) INJECTION
Status: DISCONTINUED | OUTPATIENT
Start: 2022-05-13 | End: 2022-05-13 | Stop reason: HOSPADM

## 2022-05-13 RX ORDER — FAMOTIDINE 10 MG/ML
20 INJECTION INTRAVENOUS
Status: COMPLETED | OUTPATIENT
Start: 2022-05-13 | End: 2022-05-13

## 2022-05-13 RX ORDER — DIPHENHYDRAMINE HYDROCHLORIDE 50 MG/ML
50 INJECTION INTRAMUSCULAR; INTRAVENOUS ONCE AS NEEDED
Status: CANCELLED | OUTPATIENT
Start: 2022-05-13

## 2022-05-13 RX ORDER — HEPARIN 100 UNIT/ML
500 SYRINGE INTRAVENOUS
Status: CANCELLED | OUTPATIENT
Start: 2022-05-13

## 2022-05-13 RX ORDER — DIPHENHYDRAMINE HYDROCHLORIDE 50 MG/ML
25 INJECTION INTRAMUSCULAR; INTRAVENOUS
Status: CANCELLED
Start: 2022-05-13

## 2022-05-13 RX ADMIN — FERUMOXYTOL 510 MG: 510 INJECTION INTRAVENOUS at 09:05

## 2022-05-13 RX ADMIN — FAMOTIDINE 20 MG: 10 INJECTION INTRAVENOUS at 09:05

## 2022-05-13 RX ADMIN — DIPHENHYDRAMINE HYDROCHLORIDE 25 MG: 50 INJECTION INTRAMUSCULAR; INTRAVENOUS at 09:05

## 2022-05-13 NOTE — PLAN OF CARE
Problem: Fatigue  Goal: Improved Activity Tolerance  Outcome: Ongoing, Progressing  Intervention: Promote Improved Energy  Flowsheets (Taken 5/13/2022 4943)  Fatigue Management:   frequent rest breaks encouraged   fatigue-related activity identified   paced activity encouraged  Sleep/Rest Enhancement:   regular sleep/rest pattern promoted   relaxation techniques promoted

## 2022-05-18 ENCOUNTER — PATIENT MESSAGE (OUTPATIENT)
Dept: INFUSION THERAPY | Facility: HOSPITAL | Age: 72
End: 2022-05-18
Payer: MEDICARE

## 2022-05-18 DIAGNOSIS — D50.9 IRON DEFICIENCY ANEMIA, UNSPECIFIED IRON DEFICIENCY ANEMIA TYPE: Primary | ICD-10-CM

## 2022-05-20 ENCOUNTER — INFUSION (OUTPATIENT)
Dept: INFUSION THERAPY | Facility: HOSPITAL | Age: 72
End: 2022-05-20
Attending: NURSE PRACTITIONER
Payer: MEDICARE

## 2022-05-20 VITALS
HEART RATE: 87 BPM | SYSTOLIC BLOOD PRESSURE: 112 MMHG | DIASTOLIC BLOOD PRESSURE: 68 MMHG | HEIGHT: 63 IN | RESPIRATION RATE: 18 BRPM | BODY MASS INDEX: 25.27 KG/M2 | WEIGHT: 142.63 LBS | TEMPERATURE: 97 F

## 2022-05-20 DIAGNOSIS — D50.9 IRON DEFICIENCY ANEMIA, UNSPECIFIED IRON DEFICIENCY ANEMIA TYPE: Primary | ICD-10-CM

## 2022-05-20 PROCEDURE — 96365 THER/PROPH/DIAG IV INF INIT: CPT

## 2022-05-20 PROCEDURE — 63600175 PHARM REV CODE 636 W HCPCS: Mod: JG | Performed by: NURSE PRACTITIONER

## 2022-05-20 PROCEDURE — A4216 STERILE WATER/SALINE, 10 ML: HCPCS | Performed by: NURSE PRACTITIONER

## 2022-05-20 PROCEDURE — 96367 TX/PROPH/DG ADDL SEQ IV INF: CPT

## 2022-05-20 PROCEDURE — 25000003 PHARM REV CODE 250: Performed by: NURSE PRACTITIONER

## 2022-05-20 PROCEDURE — 96375 TX/PRO/DX INJ NEW DRUG ADDON: CPT

## 2022-05-20 RX ORDER — HEPARIN 100 UNIT/ML
500 SYRINGE INTRAVENOUS
Status: CANCELLED | OUTPATIENT
Start: 2022-05-20

## 2022-05-20 RX ORDER — FAMOTIDINE 10 MG/ML
20 INJECTION INTRAVENOUS
Status: CANCELLED
Start: 2022-05-20 | End: 2022-05-20

## 2022-05-20 RX ORDER — SODIUM CHLORIDE 0.9 % (FLUSH) 0.9 %
10 SYRINGE (ML) INJECTION
Status: DISCONTINUED | OUTPATIENT
Start: 2022-05-20 | End: 2022-05-20 | Stop reason: HOSPADM

## 2022-05-20 RX ORDER — SODIUM CHLORIDE 0.9 % (FLUSH) 0.9 %
10 SYRINGE (ML) INJECTION
Status: CANCELLED | OUTPATIENT
Start: 2022-05-20

## 2022-05-20 RX ORDER — DIPHENHYDRAMINE HYDROCHLORIDE 50 MG/ML
25 INJECTION INTRAMUSCULAR; INTRAVENOUS
Status: CANCELLED
Start: 2022-05-20

## 2022-05-20 RX ORDER — DEXAMETHASONE SODIUM PHOSPHATE 4 MG/ML
4 INJECTION, SOLUTION INTRA-ARTICULAR; INTRALESIONAL; INTRAMUSCULAR; INTRAVENOUS; SOFT TISSUE
Status: CANCELLED
Start: 2022-05-20

## 2022-05-20 RX ORDER — FAMOTIDINE 10 MG/ML
20 INJECTION INTRAVENOUS
Status: COMPLETED | OUTPATIENT
Start: 2022-05-20 | End: 2022-05-20

## 2022-05-20 RX ORDER — EPINEPHRINE 0.3 MG/.3ML
0.3 INJECTION SUBCUTANEOUS ONCE AS NEEDED
Status: CANCELLED | OUTPATIENT
Start: 2022-05-20

## 2022-05-20 RX ORDER — DIPHENHYDRAMINE HYDROCHLORIDE 50 MG/ML
50 INJECTION INTRAMUSCULAR; INTRAVENOUS ONCE AS NEEDED
Status: CANCELLED | OUTPATIENT
Start: 2022-05-20

## 2022-05-20 RX ORDER — METHYLPREDNISOLONE SOD SUCC 125 MG
125 VIAL (EA) INJECTION ONCE AS NEEDED
Status: CANCELLED | OUTPATIENT
Start: 2022-05-20

## 2022-05-20 RX ADMIN — FAMOTIDINE 20 MG: 10 INJECTION INTRAVENOUS at 08:05

## 2022-05-20 RX ADMIN — FERUMOXYTOL 510 MG: 510 INJECTION INTRAVENOUS at 09:05

## 2022-05-20 RX ADMIN — SODIUM CHLORIDE: 9 INJECTION, SOLUTION INTRAVENOUS at 08:05

## 2022-05-20 RX ADMIN — DIPHENHYDRAMINE HYDROCHLORIDE 25 MG: 50 INJECTION INTRAMUSCULAR; INTRAVENOUS at 08:05

## 2022-05-20 RX ADMIN — SODIUM CHLORIDE, PRESERVATIVE FREE 10 ML: 5 INJECTION INTRAVENOUS at 09:05

## 2022-05-20 RX ADMIN — DEXAMETHASONE SODIUM PHOSPHATE: 4 INJECTION, SOLUTION INTRA-ARTICULAR; INTRALESIONAL; INTRAMUSCULAR; INTRAVENOUS; SOFT TISSUE at 08:05

## 2022-05-20 NOTE — PLAN OF CARE
Problem: Anemia  Goal: Anemia Symptom Improvement  5/20/2022 0826 by Opal Guillermo RN  Outcome: Met  5/20/2022 0826 by Opal Guillermo RN  Outcome: Ongoing, Progressing

## 2022-06-24 ENCOUNTER — PATIENT MESSAGE (OUTPATIENT)
Dept: HEMATOLOGY/ONCOLOGY | Facility: CLINIC | Age: 72
End: 2022-06-24
Payer: MEDICARE

## 2022-08-05 ENCOUNTER — PATIENT MESSAGE (OUTPATIENT)
Dept: HEMATOLOGY/ONCOLOGY | Facility: CLINIC | Age: 72
End: 2022-08-05
Payer: MEDICARE

## 2022-08-05 DIAGNOSIS — Z13.6 ENCOUNTER FOR SCREENING FOR CARDIOVASCULAR DISORDERS: ICD-10-CM

## 2022-08-05 DIAGNOSIS — D50.9 IRON DEFICIENCY ANEMIA, UNSPECIFIED IRON DEFICIENCY ANEMIA TYPE: Primary | ICD-10-CM

## 2022-08-08 ENCOUNTER — LAB VISIT (OUTPATIENT)
Dept: LAB | Facility: HOSPITAL | Age: 72
End: 2022-08-08
Attending: NURSE PRACTITIONER
Payer: MEDICARE

## 2022-08-08 DIAGNOSIS — D50.9 IRON DEFICIENCY ANEMIA, UNSPECIFIED IRON DEFICIENCY ANEMIA TYPE: ICD-10-CM

## 2022-08-08 DIAGNOSIS — Z13.6 ENCOUNTER FOR SCREENING FOR CARDIOVASCULAR DISORDERS: ICD-10-CM

## 2022-08-08 LAB
BASOPHILS # BLD AUTO: 0.04 K/UL (ref 0–0.2)
BASOPHILS NFR BLD: 0.8 % (ref 0–1.9)
CHOLEST SERPL-MCNC: 201 MG/DL (ref 120–199)
CHOLEST/HDLC SERPL: 3.5 {RATIO} (ref 2–5)
DIFFERENTIAL METHOD: ABNORMAL
EOSINOPHIL # BLD AUTO: 0.2 K/UL (ref 0–0.5)
EOSINOPHIL NFR BLD: 3.7 % (ref 0–8)
ERYTHROCYTE [DISTWIDTH] IN BLOOD BY AUTOMATED COUNT: 19.3 % (ref 11.5–14.5)
FERRITIN SERPL-MCNC: 19 NG/ML (ref 20–300)
HCT VFR BLD AUTO: 39.4 % (ref 37–48.5)
HDLC SERPL-MCNC: 57 MG/DL (ref 40–75)
HDLC SERPL: 28.4 % (ref 20–50)
HGB BLD-MCNC: 13 G/DL (ref 12–16)
IMM GRANULOCYTES # BLD AUTO: 0 K/UL (ref 0–0.04)
IMM GRANULOCYTES NFR BLD AUTO: 0 % (ref 0–0.5)
IRON SERPL-MCNC: 64 UG/DL (ref 30–160)
LDLC SERPL CALC-MCNC: 120 MG/DL (ref 63–159)
LYMPHOCYTES # BLD AUTO: 1.3 K/UL (ref 1–4.8)
LYMPHOCYTES NFR BLD: 27 % (ref 18–48)
MCH RBC QN AUTO: 30.7 PG (ref 27–31)
MCHC RBC AUTO-ENTMCNC: 33 G/DL (ref 32–36)
MCV RBC AUTO: 93 FL (ref 82–98)
MONOCYTES # BLD AUTO: 0.5 K/UL (ref 0.3–1)
MONOCYTES NFR BLD: 9.4 % (ref 4–15)
NEUTROPHILS # BLD AUTO: 2.8 K/UL (ref 1.8–7.7)
NEUTROPHILS NFR BLD: 59.1 % (ref 38–73)
NONHDLC SERPL-MCNC: 144 MG/DL
NRBC BLD-RTO: 0 /100 WBC
PLATELET # BLD AUTO: 262 K/UL (ref 150–450)
PMV BLD AUTO: 9.9 FL (ref 9.2–12.9)
RBC # BLD AUTO: 4.24 M/UL (ref 4–5.4)
SATURATED IRON: 16 % (ref 20–50)
TOTAL IRON BINDING CAPACITY: 400 UG/DL (ref 250–450)
TRANSFERRIN SERPL-MCNC: 270 MG/DL (ref 200–375)
TRIGL SERPL-MCNC: 120 MG/DL (ref 30–150)
WBC # BLD AUTO: 4.81 K/UL (ref 3.9–12.7)

## 2022-08-08 PROCEDURE — 80061 LIPID PANEL: CPT | Performed by: NURSE PRACTITIONER

## 2022-08-08 PROCEDURE — 82728 ASSAY OF FERRITIN: CPT | Performed by: NURSE PRACTITIONER

## 2022-08-08 PROCEDURE — 36415 COLL VENOUS BLD VENIPUNCTURE: CPT | Performed by: NURSE PRACTITIONER

## 2022-08-08 PROCEDURE — 85025 COMPLETE CBC W/AUTO DIFF WBC: CPT | Performed by: NURSE PRACTITIONER

## 2022-08-08 PROCEDURE — 84466 ASSAY OF TRANSFERRIN: CPT | Performed by: NURSE PRACTITIONER

## 2022-08-10 ENCOUNTER — OFFICE VISIT (OUTPATIENT)
Dept: HEMATOLOGY/ONCOLOGY | Facility: CLINIC | Age: 72
End: 2022-08-10
Payer: MEDICARE

## 2022-08-10 DIAGNOSIS — D50.9 IRON DEFICIENCY ANEMIA, UNSPECIFIED IRON DEFICIENCY ANEMIA TYPE: ICD-10-CM

## 2022-08-10 DIAGNOSIS — F41.9 ANXIETY: ICD-10-CM

## 2022-08-10 DIAGNOSIS — G47.09 OTHER INSOMNIA: ICD-10-CM

## 2022-08-10 PROCEDURE — 99212 OFFICE O/P EST SF 10 MIN: CPT | Mod: 95,,, | Performed by: NURSE PRACTITIONER

## 2022-08-10 PROCEDURE — 99212 PR OFFICE/OUTPT VISIT, EST, LEVL II, 10-19 MIN: ICD-10-PCS | Mod: 95,,, | Performed by: NURSE PRACTITIONER

## 2022-08-10 RX ORDER — DIAZEPAM 5 MG/1
10 TABLET ORAL EVERY 8 HOURS PRN
Qty: 30 TABLET | Refills: 0 | Status: SHIPPED | OUTPATIENT
Start: 2022-08-10 | End: 2023-01-03 | Stop reason: SDUPTHER

## 2022-09-01 ENCOUNTER — PATIENT MESSAGE (OUTPATIENT)
Dept: HEMATOLOGY/ONCOLOGY | Facility: CLINIC | Age: 72
End: 2022-09-01
Payer: MEDICARE

## 2023-02-07 NOTE — PLAN OF CARE
Problem: Anemia  Goal: Anemia Symptom Improvement  Outcome: Ongoing, Progressing  Intervention: Monitor and Manage Anemia  Flowsheets (Taken 5/1/2020 5849)  Fatigue Management: paced activity encouraged; frequent rest breaks encouraged      "Daily progress note    Primary care physician  Dr. Delgado    Chief complaint  Doing better with no new complaints and wants to eat     History of present illness  63-year-old female with history of hypertension presented to [facility] emergency room with left lower quadrant abdominal pain for last 1 month.  Patient also have occasional loose stools.  Patient denies any nausea vomiting.  Patient recently diagnosed with colitis and treated with oral antibiotics without any improvement.  Patient has noticed blood in the stools.  Patient evaluated in ER with CT abdominal pelvis which shows worsening colitis and failed outpatient treatment admit for management.  Patient has no fever chills chest pain shortness of breath with sweats weight loss or weight gain.     REVIEW OF SYSTEMS  Unremarkable except abdominal pain      PHYSICAL EXAM  Blood pressure 161/99, pulse 95, temperature 98.9 °F (37.2 °C), temperature source Oral, resp. rate 18, height 154.9 cm (60.98\"), weight 83.1 kg (183 lb 3.2 oz), SpO2 93 %.    GENERAL:  Awake and alert in no acute distress  HEENT:  Unremarkable  NECK:  Supple  CV: regular rhythm, normal rate  RESPIRATORY: normal effort, clear to auscultation bilaterally  ABDOMEN: soft, left lower quadrant tenderness bowel sounds positive  MUSCULOSKELETAL: no deformity  NEURO: alert, moves all extremities, follows commands  PSYCH:  calm, cooperative  SKIN: warm, dry     LAB RESULTS  Lab Results (last 24 hours)     Procedure Component Value Units Date/Time    POC Glucose Once [[id number]]  (Normal) Collected: 02/07/23 1503    Specimen: Blood Updated: 02/07/23 1504     Glucose 70 mg/dL      Comment: Meter: [id number] : [id number] Lev EUCEDA       Manual Differential [[id number]]  (Abnormal) Collected: 02/07/23 0440    Specimen: Blood Updated: 02/07/23 0803     Neutrophil % 86.9 %      Lymphocyte % 9.1 %      Monocyte % 4.0 %      Neutrophils Absolute 7.37 10*3/mm3      Lymphocytes Absolute " 0.77 10*3/mm3      Monocytes Absolute 0.34 10*3/mm3      Anisocytosis Mod/2+     [place] Cells Large/3+     Microcytes Slight/1+     Poikilocytes Large/3+     WBC Morphology Normal     Platelet Morphology Normal    CBC & Differential [[id number]]  (Abnormal) Collected: 02/07/23 0440    Specimen: Blood Updated: 02/07/23 0803    Narrative:      The following orders were created for panel order CBC & Differential.  Procedure                               Abnormality         Status                     ---------                               -----------         ------                     CBC Auto Differential[[id number]]        Abnormal            Final result                 Please view results for these tests on the individual orders.    CBC Auto Differential [[id number]]  (Abnormal) Collected: 02/07/23 0440    Specimen: Blood Updated: 02/07/23 0803     WBC 8.48 10*3/mm3      RBC 3.57 10*6/mm3      Hemoglobin 10.1 g/dL      Hematocrit 30.0 %      MCV 84.0 fL      MCH 28.3 pg      MCHC 33.7 g/dL      RDW 14.1 %      RDW-SD 43.5 fl      MPV 10.7 fL      Platelets 275 10*3/mm3     POC Glucose Once [[id number]]  (Abnormal) Collected: 02/07/23 0753    Specimen: Blood Updated: 02/07/23 0754     Glucose 181 mg/dL      Comment: Meter: [id number] : [id number] Kathi Kline RN       POC Glucose Once [[id number]]  (Abnormal) Collected: 02/07/23 0744    Specimen: Blood Updated: 02/07/23 0748     Glucose 43 mg/dL      Comment: Treated Patient Meter: [id number] : [id number] Kathi Kline RN       Renal Function Panel [[id number]]  (Abnormal) Collected: 02/07/23 0440    Specimen: Blood Updated: 02/07/23 0743     Glucose 34 mg/dL      BUN 16 mg/dL      Creatinine 0.85 mg/dL      Sodium 145 mmol/L      Potassium 2.7 mmol/L      Chloride 111 mmol/L      CO2 18.5 mmol/L      Calcium 8.9 mg/dL      Albumin 2.4 g/dL      Phosphorus 1.9 mg/dL      Anion Gap 15.5 mmol/L      BUN/Creatinine Ratio 18.8     eGFR 77.1 mL/min/1.73     Narrative:       GFR Normal >60  Chronic Kidney Disease <60  Kidney Failure <15      Magnesium [[id number]]  (Normal) Collected: 02/07/23 0440    Specimen: Blood Updated: 02/07/23 0657     Magnesium 1.7 mg/dL     Sodium, Urine, Random - Urine, Clean Catch [[id number]] Collected: 02/06/23 2217    Specimen: Urine, Clean Catch Updated: 02/06/23 2307     Sodium, Urine 48 mmol/L     Narrative:      Reference intervals for random urine have not been established.  Clinical usage is dependent upon physician's interpretation in combination with other laboratory tests.       Protein, Urine, Random - Urine, Clean Catch [[id number]] Collected: 02/06/23 2217    Specimen: Urine, Clean Catch Updated: 02/06/23 2307     Total Protein, Urine 59.9 mg/dL     Narrative:      Reference intervals for random urine have not been established.  Clinical usage is dependent upon physician's interpretation in combination with other laboratory tests.       Creatinine Urine Random (kidney function) GFR component - Urine, Clean Catch [[id number]] Collected: 02/06/23 2217    Specimen: Urine, Clean Catch Updated: 02/06/23 2307     Creatinine, Urine 57.8 mg/dL     Narrative:      Reference intervals for random urine have not been established.  Clinical usage is dependent upon physician's interpretation in combination with other laboratory tests.       Urinalysis, Microscopic Only - Urine, Clean Catch [[id number]]  (Abnormal) Collected: 02/06/23 2217    Specimen: Urine, Clean Catch Updated: 02/06/23 2255     RBC, UA 21-30 /HPF      WBC, UA 3-5 /HPF      Bacteria, UA None Seen /HPF      Squamous Epithelial Cells, UA 3-6 /HPF      Hyaline Casts, UA 0-2 /LPF      Methodology Automated Microscopy    Urinalysis With Microscopic If Indicated (No Culture) - Urine, Clean Catch [[id number]]  (Abnormal) Collected: 02/06/23 2217    Specimen: Urine, Clean Catch Updated: 02/06/23 2255     Color, UA Yellow     Appearance, UA Clear     pH, UA 6.0     Specific Gravity, UA 1.022     Glucose,  UA Negative     Ketones, UA 15 mg/dL (1+)     Bilirubin, UA Negative     Blood, UA Moderate (2+)     Protein, UA 30 mg/dL (1+)     Leuk Esterase, UA Negative     Nitrite, UA Negative     Urobilinogen, UA 0.2 E.U./dL        Imaging Results (Last 24 Hours)     Procedure Component Value Units Date/Time    XR Abdomen KUB [[id number]] Collected: 02/07/23 0058     Updated: 02/07/23 0102    Narrative:      KUB     HISTORY: Nasogastric tube placement     COMPARISON: 02/01/2023     FINDINGS:  Nasogastric tube appears to extend into the proximal stomach. Midline  surgical incision is noted. Patient is noted to have bibasilar  consolidation. Gaseous distention of bowel loops is noted.     This report was finalized on 2/7/2023 12:59 AM by Dr. Grace Sanchez M.D.           Upper and lower endoscopy results noted and discussed with patient    Current Facility-Administered Medications:   •  acetaminophen (OFIRMEV) injection 1,000 mg, 1,000 mg, Intravenous, Q6H, Lino Gaston MD, 1,000 mg at 02/07/23 1408  •  dextrose 5 % and sodium chloride 0.9 % infusion, 50 mL/hr, Intravenous, Continuous, Gideon Hope MD, Last Rate: 50 mL/hr at 02/07/23 0926, 50 mL/hr at 02/07/23 0926  •  famotidine (PEPCID) injection 20 mg, 20 mg, Intravenous, BID Josiah NICHOLS Brittany A, PA-C, 20 mg at 02/07/23 1359  •  gabapentin (NEURONTIN) 50 mg/mL solution 125 mg, 125 mg, Oral, Q8H, Lino Gaston MD, 125 mg at 02/07/23 1506  •  hydrALAZINE (APRESOLINE) injection 10 mg, 10 mg, Intravenous, Q4H PRN, Gideon Hope MD, 10 mg at 02/06/23 2047  •  HYDROmorphone (DILAUDID) injection 0.25 mg, 0.25 mg, Intravenous, Q2H PRN, 0.25 mg at 02/07/23 0408 **AND** naloxone (NARCAN) injection 0.4 mg, 0.4 mg, Intravenous, Q5 Min PRN, Lino Gaston MD  •  metoclopramide (REGLAN) injection 10 mg, 10 mg, Intravenous, Q6H, Lino Gaston MD, 10 mg at 02/07/23 1459  •  nitroglycerin (NITROSTAT) SL tablet 0.4 mg, 0.4 mg, Sublingual, Q5 Min PRN, Lino Gaston MD  •   ondansetron (ZOFRAN) injection 4 mg, 4 mg, Intravenous, Q6H PRN, Lino Gaston MD, 4 mg at 02/06/23 1248  •  piperacillin-tazobactam (ZOSYN) 3.375 g in iso-osmotic dextrose 50 ml (premix), 3.375 g, Intravenous, Q8H, Sang Singletary MD, 3.375 g at 02/07/23 1506  •  potassium chloride 10 mEq in 100 mL IVPB, 10 mEq, Intravenous, Q1H PRN, Matthew Soliz MD  •  potassium phosphate 45 mmol in sodium chloride 0.9 % 500 mL infusion, 45 mmol, Intravenous, PRN **OR** potassium phosphate 30 mmol in sodium chloride 0.9 % 250 mL infusion, 30 mmol, Intravenous, PRN **OR** potassium phosphate 15 mmol in sodium chloride 0.9 % 100 mL infusion, 15 mmol, Intravenous, PRN **OR** sodium phosphates 40 mmol in sodium chloride 0.9 % 500 mL IVPB, 40 mmol, Intravenous, PRN **OR** sodium phosphates 20 mmol in sodium chloride 0.9 % 250 mL IVPB, 20 mmol, Intravenous, PRN, Matthew Soliz MD     ASSESSMENT  Acute colitis with sigmoid stricture and microperforation s/p colon resection and colostomy  Acute kidney injury  Hypertension   Gastroesophageal reflux disease    PLAN  CPM  Postop care  IVF  Continue IV antibiotics  N.p.o.   Pain management  Continue home medications  Stress ulcer DVT prophylaxis  Nephrology consult appreciated  Colorectal surgery and GI to follow patient  Supportive care  PT OT  Discussed with family nursing staff   Follow closely and further recommendation according to hospital course    SANG SINGLETARY MD    Copied text in this note has been reviewed and is accurate as of 02/07/23

## 2023-02-08 ENCOUNTER — TELEPHONE (OUTPATIENT)
Dept: HEMATOLOGY/ONCOLOGY | Facility: CLINIC | Age: 73
End: 2023-02-08
Payer: MEDICARE

## 2023-02-08 NOTE — TELEPHONE ENCOUNTER
Patient has been been contacted with new appointment time and date .Patient verbalized understanding .

## 2023-02-09 ENCOUNTER — LAB VISIT (OUTPATIENT)
Dept: LAB | Facility: HOSPITAL | Age: 73
End: 2023-02-09
Attending: NURSE PRACTITIONER
Payer: MEDICARE

## 2023-02-09 DIAGNOSIS — D50.9 IRON DEFICIENCY ANEMIA, UNSPECIFIED IRON DEFICIENCY ANEMIA TYPE: ICD-10-CM

## 2023-02-09 LAB
BASOPHILS # BLD AUTO: 0.04 K/UL (ref 0–0.2)
BASOPHILS NFR BLD: 0.9 % (ref 0–1.9)
DIFFERENTIAL METHOD: ABNORMAL
EOSINOPHIL # BLD AUTO: 0.1 K/UL (ref 0–0.5)
EOSINOPHIL NFR BLD: 3.2 % (ref 0–8)
ERYTHROCYTE [DISTWIDTH] IN BLOOD BY AUTOMATED COUNT: 12.4 % (ref 11.5–14.5)
HCT VFR BLD AUTO: 35.6 % (ref 37–48.5)
HGB BLD-MCNC: 11.4 G/DL (ref 12–16)
IMM GRANULOCYTES # BLD AUTO: 0.01 K/UL (ref 0–0.04)
IMM GRANULOCYTES NFR BLD AUTO: 0.2 % (ref 0–0.5)
LYMPHOCYTES # BLD AUTO: 1.2 K/UL (ref 1–4.8)
LYMPHOCYTES NFR BLD: 26.8 % (ref 18–48)
MCH RBC QN AUTO: 30.6 PG (ref 27–31)
MCHC RBC AUTO-ENTMCNC: 32 G/DL (ref 32–36)
MCV RBC AUTO: 96 FL (ref 82–98)
MONOCYTES # BLD AUTO: 0.4 K/UL (ref 0.3–1)
MONOCYTES NFR BLD: 9.8 % (ref 4–15)
NEUTROPHILS # BLD AUTO: 2.6 K/UL (ref 1.8–7.7)
NEUTROPHILS NFR BLD: 59.1 % (ref 38–73)
NRBC BLD-RTO: 0 /100 WBC
PLATELET # BLD AUTO: 319 K/UL (ref 150–450)
PMV BLD AUTO: 10.3 FL (ref 9.2–12.9)
RBC # BLD AUTO: 3.72 M/UL (ref 4–5.4)
WBC # BLD AUTO: 4.4 K/UL (ref 3.9–12.7)

## 2023-02-09 PROCEDURE — 85025 COMPLETE CBC W/AUTO DIFF WBC: CPT | Performed by: NURSE PRACTITIONER

## 2023-02-09 PROCEDURE — 36415 COLL VENOUS BLD VENIPUNCTURE: CPT | Performed by: NURSE PRACTITIONER

## 2023-02-20 ENCOUNTER — TELEPHONE (OUTPATIENT)
Dept: HEMATOLOGY/ONCOLOGY | Facility: CLINIC | Age: 73
End: 2023-02-20
Payer: MEDICARE

## 2023-02-20 DIAGNOSIS — D50.9 IRON DEFICIENCY ANEMIA, UNSPECIFIED IRON DEFICIENCY ANEMIA TYPE: Primary | ICD-10-CM

## 2023-02-27 ENCOUNTER — LAB VISIT (OUTPATIENT)
Dept: LAB | Facility: HOSPITAL | Age: 73
End: 2023-02-27
Attending: NURSE PRACTITIONER
Payer: MEDICARE

## 2023-02-27 ENCOUNTER — OFFICE VISIT (OUTPATIENT)
Dept: HEMATOLOGY/ONCOLOGY | Facility: CLINIC | Age: 73
End: 2023-02-27
Payer: MEDICARE

## 2023-02-27 DIAGNOSIS — D50.9 IRON DEFICIENCY ANEMIA, UNSPECIFIED IRON DEFICIENCY ANEMIA TYPE: ICD-10-CM

## 2023-02-27 DIAGNOSIS — F41.9 ANXIETY: Primary | ICD-10-CM

## 2023-02-27 LAB
ERYTHROCYTE [DISTWIDTH] IN BLOOD BY AUTOMATED COUNT: 12.5 % (ref 11.5–14.5)
FERRITIN SERPL-MCNC: 6 NG/ML (ref 20–300)
HCT VFR BLD AUTO: 35.1 % (ref 37–48.5)
HGB BLD-MCNC: 11.2 G/DL (ref 12–16)
IMM GRANULOCYTES # BLD AUTO: 0.01 K/UL (ref 0–0.04)
IRON SERPL-MCNC: 43 UG/DL (ref 30–160)
MCH RBC QN AUTO: 30.4 PG (ref 27–31)
MCHC RBC AUTO-ENTMCNC: 31.9 G/DL (ref 32–36)
MCV RBC AUTO: 95 FL (ref 82–98)
NEUTROPHILS # BLD AUTO: 3.3 K/UL (ref 1.8–7.7)
PLATELET # BLD AUTO: 277 K/UL (ref 150–450)
PMV BLD AUTO: 10.4 FL (ref 9.2–12.9)
RBC # BLD AUTO: 3.68 M/UL (ref 4–5.4)
SATURATED IRON: 9 % (ref 20–50)
TOTAL IRON BINDING CAPACITY: 472 UG/DL (ref 250–450)
TRANSFERRIN SERPL-MCNC: 319 MG/DL (ref 200–375)
WBC # BLD AUTO: 5.2 K/UL (ref 3.9–12.7)

## 2023-02-27 PROCEDURE — 99213 PR OFFICE/OUTPT VISIT, EST, LEVL III, 20-29 MIN: ICD-10-PCS | Mod: 95,,, | Performed by: NURSE PRACTITIONER

## 2023-02-27 PROCEDURE — 36415 COLL VENOUS BLD VENIPUNCTURE: CPT | Performed by: NURSE PRACTITIONER

## 2023-02-27 PROCEDURE — 85027 COMPLETE CBC AUTOMATED: CPT | Performed by: NURSE PRACTITIONER

## 2023-02-27 PROCEDURE — 82728 ASSAY OF FERRITIN: CPT | Performed by: NURSE PRACTITIONER

## 2023-02-27 PROCEDURE — 99213 OFFICE O/P EST LOW 20 MIN: CPT | Mod: 95,,, | Performed by: NURSE PRACTITIONER

## 2023-02-27 PROCEDURE — 84466 ASSAY OF TRANSFERRIN: CPT | Performed by: NURSE PRACTITIONER

## 2023-02-27 RX ORDER — DIAZEPAM 5 MG/1
10 TABLET ORAL EVERY 8 HOURS PRN
Qty: 30 TABLET | Refills: 0 | Status: SHIPPED | OUTPATIENT
Start: 2023-02-27 | End: 2023-05-31 | Stop reason: SDUPTHER

## 2023-02-27 NOTE — PROGRESS NOTES
The patient location is: Home  The chief complaint leading to consultation is: Review of labs    Visit type: audiovisual    Face to Face time with patient: 15 minutes of total time spent on the encounter, which includes face to face time and non-face to face time preparing to see the patient (eg, review of tests), Obtaining and/or reviewing separately obtained history, Documenting clinical information in the electronic or other health record, Independently interpreting results (not separately reported) and communicating results to the patient/family/caregiver, or Care coordination (not separately reported).     Each patient to whom he or she provides medical services by telemedicine is:  (1) informed of the relationship between the physician and patient and the respective role of any other health care provider with respect to management of the patient; and (2) notified that he or she may decline to receive medical services by telemedicine and may withdraw from such care at any time.    History of present illness:  The patient is a 72-year-old female of Dr. Santiago's known for iron deficiency anemia who has completed IV iron replacement (05/2019, 05/2020 & recently 05/2022).      She presents via VV with her  (MD) to review labs with no complaints.  She denies fatigue or shortness of breath.  She continues to stay active and watch grandchildren.  Just returned from a trip to [place] without c/o of fatigue, SOB, CP, bleeding, melena, etc.  To note:  UGI/Colonoscopy done in 05/2020 - will follow with Dr. Hsieh in 05/2023.  CBC & Ferritin reviewed; Iron studies pending.  Will be taking a trip out of the country in June; lab values will dictate iron now or observe another 2 months.    Physical examination:  Well-developed, well-nourished, white female, in no acute distress, Alert and oriented x4.  Respiratory effort is normal.  Physical exam is deferred as this is a virtual encounter.    Lab Results    Component Value Date    WBC 5.20 02/27/2023    RBC 3.68 (L) 02/27/2023    HGB 11.2 (L) 02/27/2023    HCT 35.1 (L) 02/27/2023    MCV 95 02/27/2023    MCH 30.4 02/27/2023    MCHC 31.9 (L) 02/27/2023    RDW 12.5 02/27/2023     02/27/2023    MPV 10.4 02/27/2023    GRAN 3.3 02/27/2023    LYMPH 1.2 02/09/2023    LYMPH 26.8 02/09/2023    MONO 0.4 02/09/2023    MONO 9.8 02/09/2023    EOS 0.1 02/09/2023    BASO 0.04 02/09/2023    EOSINOPHIL 3.2 02/09/2023    BASOPHIL 0.9 02/09/2023     Hgb drop from 13 to 11.2    Lab Results   Component Value Date    IRON 64 08/08/2022    TIBC 400 08/08/2022    FERRITIN 6 (L) 02/27/2023     Ferritin drop from 19 to 6    Iron studies pending    Impression:  1.  Recurring iron deficiency anemia - lab values dropping   2. Generalized anxiety disorder - requested refill of Vallium    Plan:  1.  Rx Valium refilled.  2.  Will wait on Iron studies to result to determine Feraheme now or observe with labs in 2 months.            Route Chart for Scheduling    Med Onc Chart Routing      Follow up with physician    Follow up with BRAYAN . Waiting on labs to result   Infusion scheduling note    Injection scheduling note    Labs    Imaging    Pharmacy appointment    Other referrals          Therapy Plan Information  Flushes  heparin, porcine (PF) 100 unit/mL injection flush 500 Units  500 Units, Intravenous, PRN  PRN Medications  EPINEPHrine (EPIPEN) 0.3 mg/0.3 mL pen injection 0.3 mg  0.3 mg, Intramuscular, PRN  diphenhydrAMINE injection 50 mg  50 mg, Intravenous, PRN  methylPREDNISolone sodium succinate injection 125 mg  125 mg, Intravenous, PRN  sodium chloride 0.9% bolus 1,000 mL  1,000 mL, Intravenous, PRN        20 minutes were spent in coordination of patient's care, record review and counseling.         Answers submitted by the patient for this visit:  Review of Systems Questionnaire (Submitted on 2/27/2023)  appetite change : No  unexpected weight change: No  mouth sores: No  visual  disturbance: No  cough: No  shortness of breath: No  chest pain: No  abdominal pain: No  diarrhea: No  frequency: No  back pain: No  rash: No  headaches: No  adenopathy: No  nervous/ anxious: No

## 2023-02-28 ENCOUNTER — PATIENT MESSAGE (OUTPATIENT)
Dept: HEMATOLOGY/ONCOLOGY | Facility: CLINIC | Age: 73
End: 2023-02-28
Payer: MEDICARE

## 2023-02-28 DIAGNOSIS — D50.9 IRON DEFICIENCY ANEMIA, UNSPECIFIED IRON DEFICIENCY ANEMIA TYPE: Primary | ICD-10-CM

## 2023-02-28 RX ORDER — EPINEPHRINE 0.3 MG/.3ML
0.3 INJECTION SUBCUTANEOUS ONCE AS NEEDED
Status: CANCELLED | OUTPATIENT
Start: 2023-02-28

## 2023-02-28 RX ORDER — METHYLPREDNISOLONE SOD SUCC 125 MG
125 VIAL (EA) INJECTION ONCE AS NEEDED
Status: CANCELLED | OUTPATIENT
Start: 2023-02-28

## 2023-02-28 RX ORDER — FAMOTIDINE 10 MG/ML
20 INJECTION INTRAVENOUS
Status: CANCELLED
Start: 2023-02-28 | End: 2023-02-28

## 2023-02-28 RX ORDER — DIPHENHYDRAMINE HYDROCHLORIDE 50 MG/ML
50 INJECTION INTRAMUSCULAR; INTRAVENOUS ONCE AS NEEDED
Status: CANCELLED | OUTPATIENT
Start: 2023-02-28

## 2023-02-28 RX ORDER — SODIUM CHLORIDE 0.9 % (FLUSH) 0.9 %
10 SYRINGE (ML) INJECTION
Status: CANCELLED | OUTPATIENT
Start: 2023-02-28

## 2023-02-28 RX ORDER — HEPARIN 100 UNIT/ML
500 SYRINGE INTRAVENOUS
Status: CANCELLED | OUTPATIENT
Start: 2023-02-28

## 2023-02-28 RX ORDER — DIPHENHYDRAMINE HYDROCHLORIDE 50 MG/ML
25 INJECTION INTRAMUSCULAR; INTRAVENOUS
Status: CANCELLED
Start: 2023-02-28

## 2023-02-28 RX ORDER — DEXAMETHASONE SODIUM PHOSPHATE 4 MG/ML
4 INJECTION, SOLUTION INTRA-ARTICULAR; INTRALESIONAL; INTRAMUSCULAR; INTRAVENOUS; SOFT TISSUE
Status: CANCELLED
Start: 2023-02-28

## 2023-02-28 NOTE — PROGRESS NOTES
Feraheme x 2 treatment plan placed for auth.    F/U in 3 months post infusions with CBC, iron studies/ferritin/sTFR prior (in [place]).

## 2023-03-01 ENCOUNTER — PATIENT MESSAGE (OUTPATIENT)
Dept: HEMATOLOGY/ONCOLOGY | Facility: CLINIC | Age: 73
End: 2023-03-01
Payer: MEDICARE

## 2023-03-01 NOTE — PROGRESS NOTES
Feraheme location changed to [facility] infusion; staff message sent to schedulers

## 2023-03-14 ENCOUNTER — PATIENT MESSAGE (OUTPATIENT)
Dept: HEMATOLOGY/ONCOLOGY | Facility: CLINIC | Age: 73
End: 2023-03-14
Payer: MEDICARE

## 2023-03-15 ENCOUNTER — PATIENT MESSAGE (OUTPATIENT)
Dept: HEMATOLOGY/ONCOLOGY | Facility: CLINIC | Age: 73
End: 2023-03-15
Payer: MEDICARE

## 2023-03-15 ENCOUNTER — TELEPHONE (OUTPATIENT)
Dept: HEMATOLOGY/ONCOLOGY | Facility: CLINIC | Age: 73
End: 2023-03-15
Payer: MEDICARE

## 2023-03-15 NOTE — TELEPHONE ENCOUNTER
Per patient ok to wait and schedule in [place]. Kesha Patel made aware.   ----- Message from Kesha Patel sent at 3/15/2023  1:28 PM CDT -----  Regarding: Iron Infusion  Cielo,    At the moment, we are scheduling a month out for our iron infusions. I have several in front of her to schedule.  If she doesn't wish to wait, you could try [facility] infusion center, [facility], [facility] or possibly [place], they are infusing again.    Let me know if she wants to schedule elsewhere.    Thanks,  Kesha  Infusion   ----- Message -----  From: Mi Mills  Sent: 3/14/2023   9:43 AM CDT  To: Kehsa Patel      ----- Message -----  From: Cielo Andrea MA  Sent: 3/14/2023   9:04 AM CDT  To: [facility] Infusion Scheduling Pool    Patient has orders in for Iron infusion and is checking on status of scheduling. Can you assist?

## 2023-04-10 ENCOUNTER — INFUSION (OUTPATIENT)
Dept: INFUSION THERAPY | Facility: HOSPITAL | Age: 73
End: 2023-04-10
Attending: NURSE PRACTITIONER
Payer: MEDICARE

## 2023-04-10 VITALS
HEIGHT: 63 IN | BODY MASS INDEX: 25.04 KG/M2 | DIASTOLIC BLOOD PRESSURE: 78 MMHG | RESPIRATION RATE: 16 BRPM | WEIGHT: 141.31 LBS | HEART RATE: 51 BPM | TEMPERATURE: 98 F | SYSTOLIC BLOOD PRESSURE: 125 MMHG | OXYGEN SATURATION: 99 %

## 2023-04-10 DIAGNOSIS — D50.9 IRON DEFICIENCY ANEMIA, UNSPECIFIED IRON DEFICIENCY ANEMIA TYPE: Primary | ICD-10-CM

## 2023-04-10 PROCEDURE — 96367 TX/PROPH/DG ADDL SEQ IV INF: CPT

## 2023-04-10 PROCEDURE — 96375 TX/PRO/DX INJ NEW DRUG ADDON: CPT

## 2023-04-10 PROCEDURE — 63600175 PHARM REV CODE 636 W HCPCS: Performed by: NURSE PRACTITIONER

## 2023-04-10 PROCEDURE — 25000003 PHARM REV CODE 250: Performed by: NURSE PRACTITIONER

## 2023-04-10 PROCEDURE — A4216 STERILE WATER/SALINE, 10 ML: HCPCS | Performed by: NURSE PRACTITIONER

## 2023-04-10 PROCEDURE — 96365 THER/PROPH/DIAG IV INF INIT: CPT

## 2023-04-10 RX ORDER — SODIUM CHLORIDE 0.9 % (FLUSH) 0.9 %
10 SYRINGE (ML) INJECTION
Status: DISCONTINUED | OUTPATIENT
Start: 2023-04-10 | End: 2023-04-10 | Stop reason: HOSPADM

## 2023-04-10 RX ORDER — DIPHENHYDRAMINE HYDROCHLORIDE 50 MG/ML
25 INJECTION INTRAMUSCULAR; INTRAVENOUS
Status: CANCELLED
Start: 2023-04-10

## 2023-04-10 RX ORDER — DEXAMETHASONE SODIUM PHOSPHATE 4 MG/ML
4 INJECTION, SOLUTION INTRA-ARTICULAR; INTRALESIONAL; INTRAMUSCULAR; INTRAVENOUS; SOFT TISSUE
Status: CANCELLED
Start: 2023-04-10

## 2023-04-10 RX ORDER — FAMOTIDINE 10 MG/ML
20 INJECTION INTRAVENOUS
Status: COMPLETED | OUTPATIENT
Start: 2023-04-10 | End: 2023-04-10

## 2023-04-10 RX ORDER — HEPARIN 100 UNIT/ML
500 SYRINGE INTRAVENOUS
Status: CANCELLED | OUTPATIENT
Start: 2023-04-10

## 2023-04-10 RX ORDER — FAMOTIDINE 10 MG/ML
20 INJECTION INTRAVENOUS
Status: CANCELLED
Start: 2023-04-10 | End: 2023-04-10

## 2023-04-10 RX ORDER — METHYLPREDNISOLONE SOD SUCC 125 MG
125 VIAL (EA) INJECTION ONCE AS NEEDED
Status: CANCELLED | OUTPATIENT
Start: 2023-04-10

## 2023-04-10 RX ORDER — EPINEPHRINE 0.3 MG/.3ML
0.3 INJECTION SUBCUTANEOUS ONCE AS NEEDED
Status: CANCELLED | OUTPATIENT
Start: 2023-04-10

## 2023-04-10 RX ORDER — SODIUM CHLORIDE 0.9 % (FLUSH) 0.9 %
10 SYRINGE (ML) INJECTION
Status: CANCELLED | OUTPATIENT
Start: 2023-04-10

## 2023-04-10 RX ORDER — DIPHENHYDRAMINE HYDROCHLORIDE 50 MG/ML
50 INJECTION INTRAMUSCULAR; INTRAVENOUS ONCE AS NEEDED
Status: CANCELLED | OUTPATIENT
Start: 2023-04-10

## 2023-04-10 RX ADMIN — FAMOTIDINE 20 MG: 10 INJECTION INTRAVENOUS at 11:04

## 2023-04-10 RX ADMIN — FERUMOXYTOL 510 MG: 510 INJECTION INTRAVENOUS at 12:04

## 2023-04-10 RX ADMIN — SODIUM CHLORIDE: 0.9 INJECTION, SOLUTION INTRAVENOUS at 11:04

## 2023-04-10 RX ADMIN — SODIUM CHLORIDE, PRESERVATIVE FREE 10 ML: 5 INJECTION INTRAVENOUS at 12:04

## 2023-04-10 RX ADMIN — DEXAMETHASONE SODIUM PHOSPHATE: 4 INJECTION, SOLUTION INTRA-ARTICULAR; INTRALESIONAL; INTRAMUSCULAR; INTRAVENOUS; SOFT TISSUE at 11:04

## 2023-04-10 RX ADMIN — DIPHENHYDRAMINE HYDROCHLORIDE 25 MG: 50 INJECTION, SOLUTION INTRAMUSCULAR; INTRAVENOUS at 11:04

## 2023-04-10 NOTE — PLAN OF CARE
Problem: Anemia  Goal: Anemia Symptom Improvement  Outcome: Ongoing, Progressing  Intervention: Monitor and Manage Anemia  Flowsheets (Taken 4/10/2023 1104)  Oral Nutrition Promotion: rest periods promoted  Safety Promotion/Fall Prevention: medications reviewed  Fatigue Management: frequent rest breaks encouraged

## 2023-04-17 ENCOUNTER — INFUSION (OUTPATIENT)
Dept: INFUSION THERAPY | Facility: HOSPITAL | Age: 73
End: 2023-04-17
Attending: NURSE PRACTITIONER
Payer: MEDICARE

## 2023-04-17 VITALS
TEMPERATURE: 98 F | DIASTOLIC BLOOD PRESSURE: 73 MMHG | WEIGHT: 139.63 LBS | OXYGEN SATURATION: 99 % | RESPIRATION RATE: 16 BRPM | HEIGHT: 63 IN | HEART RATE: 51 BPM | BODY MASS INDEX: 24.74 KG/M2 | SYSTOLIC BLOOD PRESSURE: 107 MMHG

## 2023-04-17 DIAGNOSIS — D50.9 IRON DEFICIENCY ANEMIA, UNSPECIFIED IRON DEFICIENCY ANEMIA TYPE: Primary | ICD-10-CM

## 2023-04-17 PROCEDURE — 96376 TX/PRO/DX INJ SAME DRUG ADON: CPT

## 2023-04-17 PROCEDURE — 96365 THER/PROPH/DIAG IV INF INIT: CPT

## 2023-04-17 PROCEDURE — 25000003 PHARM REV CODE 250: Performed by: NURSE PRACTITIONER

## 2023-04-17 PROCEDURE — 63600175 PHARM REV CODE 636 W HCPCS: Performed by: NURSE PRACTITIONER

## 2023-04-17 PROCEDURE — 96367 TX/PROPH/DG ADDL SEQ IV INF: CPT

## 2023-04-17 RX ORDER — FAMOTIDINE 10 MG/ML
20 INJECTION INTRAVENOUS
Status: COMPLETED | OUTPATIENT
Start: 2023-04-17 | End: 2023-04-17

## 2023-04-17 RX ORDER — SODIUM CHLORIDE 0.9 % (FLUSH) 0.9 %
10 SYRINGE (ML) INJECTION
Status: CANCELLED | OUTPATIENT
Start: 2023-04-17

## 2023-04-17 RX ORDER — HEPARIN 100 UNIT/ML
500 SYRINGE INTRAVENOUS
Status: CANCELLED | OUTPATIENT
Start: 2023-04-17

## 2023-04-17 RX ORDER — METHYLPREDNISOLONE SOD SUCC 125 MG
125 VIAL (EA) INJECTION ONCE AS NEEDED
Status: CANCELLED | OUTPATIENT
Start: 2023-04-17

## 2023-04-17 RX ORDER — FAMOTIDINE 10 MG/ML
20 INJECTION INTRAVENOUS
Status: CANCELLED
Start: 2023-04-17 | End: 2023-04-17

## 2023-04-17 RX ORDER — DEXAMETHASONE SODIUM PHOSPHATE 4 MG/ML
4 INJECTION, SOLUTION INTRA-ARTICULAR; INTRALESIONAL; INTRAMUSCULAR; INTRAVENOUS; SOFT TISSUE
Status: CANCELLED
Start: 2023-04-17

## 2023-04-17 RX ORDER — DIPHENHYDRAMINE HYDROCHLORIDE 50 MG/ML
50 INJECTION INTRAMUSCULAR; INTRAVENOUS ONCE AS NEEDED
Status: CANCELLED | OUTPATIENT
Start: 2023-04-17

## 2023-04-17 RX ORDER — EPINEPHRINE 0.3 MG/.3ML
0.3 INJECTION SUBCUTANEOUS ONCE AS NEEDED
Status: CANCELLED | OUTPATIENT
Start: 2023-04-17

## 2023-04-17 RX ORDER — DIPHENHYDRAMINE HYDROCHLORIDE 50 MG/ML
25 INJECTION INTRAMUSCULAR; INTRAVENOUS
Status: CANCELLED
Start: 2023-04-17

## 2023-04-17 RX ORDER — SODIUM CHLORIDE 0.9 % (FLUSH) 0.9 %
10 SYRINGE (ML) INJECTION
Status: DISCONTINUED | OUTPATIENT
Start: 2023-04-17 | End: 2023-04-17 | Stop reason: HOSPADM

## 2023-04-17 RX ADMIN — FERUMOXYTOL 510 MG: 510 INJECTION INTRAVENOUS at 12:04

## 2023-04-17 RX ADMIN — DEXAMETHASONE SODIUM PHOSPHATE: 4 INJECTION, SOLUTION INTRA-ARTICULAR; INTRALESIONAL; INTRAMUSCULAR; INTRAVENOUS; SOFT TISSUE at 12:04

## 2023-04-17 RX ADMIN — SODIUM CHLORIDE: 0.9 INJECTION, SOLUTION INTRAVENOUS at 11:04

## 2023-04-17 RX ADMIN — DIPHENHYDRAMINE HYDROCHLORIDE 25 MG: 50 INJECTION, SOLUTION INTRAMUSCULAR; INTRAVENOUS at 11:04

## 2023-04-17 RX ADMIN — FAMOTIDINE 20 MG: 10 INJECTION INTRAVENOUS at 11:04

## 2023-05-26 ENCOUNTER — LAB VISIT (OUTPATIENT)
Dept: LAB | Facility: HOSPITAL | Age: 73
End: 2023-05-26
Attending: NURSE PRACTITIONER
Payer: MEDICARE

## 2023-05-26 DIAGNOSIS — D50.9 IRON DEFICIENCY ANEMIA, UNSPECIFIED IRON DEFICIENCY ANEMIA TYPE: ICD-10-CM

## 2023-05-26 LAB
BASOPHILS # BLD AUTO: 0.04 K/UL (ref 0–0.2)
BASOPHILS NFR BLD: 1 % (ref 0–1.9)
DIFFERENTIAL METHOD: ABNORMAL
EOSINOPHIL # BLD AUTO: 0.1 K/UL (ref 0–0.5)
EOSINOPHIL NFR BLD: 1.8 % (ref 0–8)
ERYTHROCYTE [DISTWIDTH] IN BLOOD BY AUTOMATED COUNT: 15.6 % (ref 11.5–14.5)
FERRITIN SERPL-MCNC: 123 NG/ML (ref 20–300)
HCT VFR BLD AUTO: 32.7 % (ref 37–48.5)
HGB BLD-MCNC: 11.2 G/DL (ref 12–16)
IMM GRANULOCYTES # BLD AUTO: 0 K/UL (ref 0–0.04)
IMM GRANULOCYTES NFR BLD AUTO: 0 % (ref 0–0.5)
IRON SERPL-MCNC: 126 UG/DL (ref 30–160)
LYMPHOCYTES # BLD AUTO: 1.3 K/UL (ref 1–4.8)
LYMPHOCYTES NFR BLD: 33.4 % (ref 18–48)
MCH RBC QN AUTO: 31.5 PG (ref 27–31)
MCHC RBC AUTO-ENTMCNC: 34.3 G/DL (ref 32–36)
MCV RBC AUTO: 92 FL (ref 82–98)
MONOCYTES # BLD AUTO: 0.3 K/UL (ref 0.3–1)
MONOCYTES NFR BLD: 8.4 % (ref 4–15)
NEUTROPHILS # BLD AUTO: 2.1 K/UL (ref 1.8–7.7)
NEUTROPHILS NFR BLD: 55.4 % (ref 38–73)
NRBC BLD-RTO: 0 /100 WBC
PLATELET # BLD AUTO: 275 K/UL (ref 150–450)
PMV BLD AUTO: 9.1 FL (ref 9.2–12.9)
RBC # BLD AUTO: 3.56 M/UL (ref 4–5.4)
SATURATED IRON: 40 % (ref 20–50)
TOTAL IRON BINDING CAPACITY: 312 UG/DL (ref 250–450)
TRANSFERRIN SERPL-MCNC: 211 MG/DL (ref 200–375)
WBC # BLD AUTO: 3.83 K/UL (ref 3.9–12.7)

## 2023-05-26 PROCEDURE — 82728 ASSAY OF FERRITIN: CPT | Performed by: NURSE PRACTITIONER

## 2023-05-26 PROCEDURE — 85025 COMPLETE CBC W/AUTO DIFF WBC: CPT | Performed by: NURSE PRACTITIONER

## 2023-05-26 PROCEDURE — 36415 COLL VENOUS BLD VENIPUNCTURE: CPT | Performed by: NURSE PRACTITIONER

## 2023-05-26 PROCEDURE — 84466 ASSAY OF TRANSFERRIN: CPT | Performed by: NURSE PRACTITIONER

## 2023-05-29 LAB — STFR SERPL-MCNC: 2.1 MG/L (ref 1.8–4.6)

## 2023-05-31 ENCOUNTER — OFFICE VISIT (OUTPATIENT)
Dept: HEMATOLOGY/ONCOLOGY | Facility: CLINIC | Age: 73
End: 2023-05-31
Payer: MEDICARE

## 2023-05-31 ENCOUNTER — TELEPHONE (OUTPATIENT)
Dept: HEMATOLOGY/ONCOLOGY | Facility: CLINIC | Age: 73
End: 2023-05-31
Payer: MEDICARE

## 2023-05-31 DIAGNOSIS — D50.9 IRON DEFICIENCY ANEMIA, UNSPECIFIED IRON DEFICIENCY ANEMIA TYPE: ICD-10-CM

## 2023-05-31 PROCEDURE — 99213 PR OFFICE/OUTPT VISIT, EST, LEVL III, 20-29 MIN: ICD-10-PCS | Mod: 95,,, | Performed by: NURSE PRACTITIONER

## 2023-05-31 PROCEDURE — 99213 OFFICE O/P EST LOW 20 MIN: CPT | Mod: 95,,, | Performed by: NURSE PRACTITIONER

## 2023-05-31 RX ORDER — DIAZEPAM 5 MG/1
10 TABLET ORAL EVERY 8 HOURS PRN
Qty: 30 TABLET | Refills: 0 | Status: SHIPPED | OUTPATIENT
Start: 2023-05-31 | End: 2023-11-30 | Stop reason: SDUPTHER

## 2023-05-31 NOTE — PROGRESS NOTES
The patient location is: Home  The chief complaint leading to consultation is: Review of labs    Visit type: audiovisual    Face to Face time with patient: 20 minutes of total time spent on the encounter, which includes face to face time and non-face to face time preparing to see the patient (eg, review of tests), Obtaining and/or reviewing separately obtained history, Documenting clinical information in the electronic or other health record, Independently interpreting results (not separately reported) and communicating results to the patient/family/caregiver, or Care coordination (not separately reported).     Each patient to whom he or she provides medical services by telemedicine is:  (1) informed of the relationship between the physician and patient and the respective role of any other health care provider with respect to management of the patient; and (2) notified that he or she may decline to receive medical services by telemedicine and may withdraw from such care at any time.    History of present illness:  The patient is a 72-year-old female of Dr. Santiago's known for iron deficiency anemia who has completed IV iron replacement (05/2019, 05/2020, 05/2022 & recently 04/2023).      To note:  UGI/Colonoscopy done in 05/2020 - will follow with Dr. Hsieh in 05/2023.    She presents via VV with her  (MD) to review labs with no complaints.  She denies fatigue or shortness of breath, CP, palpitations, bleeding, melena, pica, etc.  She remains active and in good health.     Getting ready for a trip in June to July.  Requests a refill on Valium.    Physical examination:  Well-developed, well-nourished, white female, in no acute distress, Alert and oriented x4.  Respiratory effort is normal.  Physical exam is deferred as this is a virtual encounter.    Lab Results   Component Value Date    WBC 3.83 (L) 05/26/2023    RBC 3.56 (L) 05/26/2023    HGB 11.2 (L) 05/26/2023    HCT 32.7 (L) 05/26/2023    MCV 92  05/26/2023    MCH 31.5 (H) 05/26/2023    MCHC 34.3 05/26/2023    RDW 15.6 (H) 05/26/2023     05/26/2023    MPV 9.1 (L) 05/26/2023    GRAN 2.1 05/26/2023    GRAN 55.4 05/26/2023    LYMPH 1.3 05/26/2023    LYMPH 33.4 05/26/2023    MONO 0.3 05/26/2023    MONO 8.4 05/26/2023    EOS 0.1 05/26/2023    BASO 0.04 05/26/2023    EOSINOPHIL 1.8 05/26/2023    BASOPHIL 1.0 05/26/2023        Lab Results   Component Value Date    IRON 126 05/26/2023    TIBC 312 05/26/2023    FERRITIN 123 05/26/2023      sTFR = 2.1       Impression:  1.  Recurring iron deficiency anemia - stable post IV iron in April/2023.  2. Generalized anxiety disorder - requested refill of Vallium    Plan:  1.  Rx Valium refilled.  2.  F/U in 6 months with CBC, iron studies/ferritin/sTFR prior (lab at [facility])     Route Chart for Scheduling    Med Onc Chart Routing      Follow up with physician    Follow up with BRAYAN 6 months. f/u in 6 months VV with labs prior @ [facility]:  CBC, iron studies/ferritin/sTFR   Infusion scheduling note    Injection scheduling note    Labs    Imaging    Pharmacy appointment    Other referrals             Therapy Plan Information  Flushes  heparin, porcine (PF) 100 unit/mL injection flush 500 Units  500 Units, Intravenous, PRN  PRN Medications  EPINEPHrine (EPIPEN) 0.3 mg/0.3 mL pen injection 0.3 mg  0.3 mg, Intramuscular, PRN  diphenhydrAMINE injection 50 mg  50 mg, Intravenous, PRN  methylPREDNISolone sodium succinate injection 125 mg  125 mg, Intravenous, PRN  sodium chloride 0.9% bolus 1,000 mL 1,000 mL  1,000 mL, Intravenous, PRN        20 minutes were spent in coordination of patient's care, record review and counseling.           Answers submitted by the patient for this visit:  Review of Systems Questionnaire (Submitted on 5/29/2023)  appetite change : No  unexpected weight change: No  mouth sores: No  visual disturbance: No  cough: No  shortness of breath: No  chest pain: No  abdominal pain:  No  diarrhea: No  frequency: No  back pain: No  rash: No  headaches: No  adenopathy: No  nervous/ anxious: No

## 2023-05-31 NOTE — TELEPHONE ENCOUNTER
Called and spoke with pt to let her know that Mrs. Godoy is running, but that if she would please stay logged in that Mrs. Godoy would be on with her shortly. Pt verbalize understanding.

## 2023-11-27 ENCOUNTER — LAB VISIT (OUTPATIENT)
Dept: LAB | Facility: HOSPITAL | Age: 73
End: 2023-11-27
Attending: NURSE PRACTITIONER
Payer: MEDICARE

## 2023-11-27 DIAGNOSIS — D50.9 IRON DEFICIENCY ANEMIA, UNSPECIFIED IRON DEFICIENCY ANEMIA TYPE: ICD-10-CM

## 2023-11-27 LAB
BASOPHILS # BLD AUTO: 0.03 K/UL (ref 0–0.2)
BASOPHILS NFR BLD: 0.8 % (ref 0–1.9)
DIFFERENTIAL METHOD: ABNORMAL
EOSINOPHIL # BLD AUTO: 0.1 K/UL (ref 0–0.5)
EOSINOPHIL NFR BLD: 3 % (ref 0–8)
ERYTHROCYTE [DISTWIDTH] IN BLOOD BY AUTOMATED COUNT: 12.2 % (ref 11.5–14.5)
FERRITIN SERPL-MCNC: 7 NG/ML (ref 20–300)
HCT VFR BLD AUTO: 32.4 % (ref 37–48.5)
HGB BLD-MCNC: 10.4 G/DL (ref 12–16)
IMM GRANULOCYTES # BLD AUTO: 0.01 K/UL (ref 0–0.04)
IMM GRANULOCYTES NFR BLD AUTO: 0.3 % (ref 0–0.5)
IRON SERPL-MCNC: 32 UG/DL (ref 30–160)
LYMPHOCYTES # BLD AUTO: 0.9 K/UL (ref 1–4.8)
LYMPHOCYTES NFR BLD: 23.9 % (ref 18–48)
MCH RBC QN AUTO: 30.9 PG (ref 27–31)
MCHC RBC AUTO-ENTMCNC: 32.1 G/DL (ref 32–36)
MCV RBC AUTO: 96 FL (ref 82–98)
MONOCYTES # BLD AUTO: 0.3 K/UL (ref 0.3–1)
MONOCYTES NFR BLD: 7.9 % (ref 4–15)
NEUTROPHILS # BLD AUTO: 2.5 K/UL (ref 1.8–7.7)
NEUTROPHILS NFR BLD: 64.1 % (ref 38–73)
NRBC BLD-RTO: 0 /100 WBC
PLATELET # BLD AUTO: 286 K/UL (ref 150–450)
PMV BLD AUTO: 9.5 FL (ref 9.2–12.9)
RBC # BLD AUTO: 3.37 M/UL (ref 4–5.4)
SATURATED IRON: 7 % (ref 20–50)
TOTAL IRON BINDING CAPACITY: 469 UG/DL (ref 250–450)
TRANSFERRIN SERPL-MCNC: 335 MG/DL (ref 200–375)
WBC # BLD AUTO: 3.94 K/UL (ref 3.9–12.7)

## 2023-11-27 PROCEDURE — 36415 COLL VENOUS BLD VENIPUNCTURE: CPT | Performed by: NURSE PRACTITIONER

## 2023-11-27 PROCEDURE — 85025 COMPLETE CBC W/AUTO DIFF WBC: CPT | Performed by: NURSE PRACTITIONER

## 2023-11-27 PROCEDURE — 83540 ASSAY OF IRON: CPT | Performed by: NURSE PRACTITIONER

## 2023-11-27 PROCEDURE — 82728 ASSAY OF FERRITIN: CPT | Performed by: NURSE PRACTITIONER

## 2023-11-27 PROCEDURE — 84466 ASSAY OF TRANSFERRIN: CPT | Performed by: NURSE PRACTITIONER

## 2023-11-29 LAB — STFR SERPL-MCNC: 4.1 MG/L (ref 1.8–4.6)

## 2023-11-30 ENCOUNTER — OFFICE VISIT (OUTPATIENT)
Dept: HEMATOLOGY/ONCOLOGY | Facility: CLINIC | Age: 73
End: 2023-11-30
Payer: MEDICARE

## 2023-11-30 DIAGNOSIS — D50.9 IRON DEFICIENCY ANEMIA, UNSPECIFIED IRON DEFICIENCY ANEMIA TYPE: Primary | ICD-10-CM

## 2023-11-30 DIAGNOSIS — F41.9 ANXIETY: ICD-10-CM

## 2023-11-30 PROCEDURE — 99213 OFFICE O/P EST LOW 20 MIN: CPT | Mod: 95,,, | Performed by: NURSE PRACTITIONER

## 2023-11-30 PROCEDURE — 99213 PR OFFICE/OUTPT VISIT, EST, LEVL III, 20-29 MIN: ICD-10-PCS | Mod: 95,,, | Performed by: NURSE PRACTITIONER

## 2023-11-30 RX ORDER — HEPARIN 100 UNIT/ML
500 SYRINGE INTRAVENOUS
Status: CANCELLED | OUTPATIENT
Start: 2023-11-30

## 2023-11-30 RX ORDER — DIPHENHYDRAMINE HYDROCHLORIDE 50 MG/ML
50 INJECTION INTRAMUSCULAR; INTRAVENOUS ONCE AS NEEDED
Status: CANCELLED | OUTPATIENT
Start: 2023-11-30

## 2023-11-30 RX ORDER — DIAZEPAM 5 MG/1
10 TABLET ORAL EVERY 8 HOURS PRN
Qty: 30 TABLET | Refills: 0 | Status: SHIPPED | OUTPATIENT
Start: 2023-11-30 | End: 2024-02-29 | Stop reason: SDUPTHER

## 2023-11-30 RX ORDER — SODIUM CHLORIDE 0.9 % (FLUSH) 0.9 %
10 SYRINGE (ML) INJECTION
Status: CANCELLED | OUTPATIENT
Start: 2023-11-30

## 2023-11-30 RX ORDER — EPINEPHRINE 0.3 MG/.3ML
0.3 INJECTION SUBCUTANEOUS ONCE AS NEEDED
Status: CANCELLED | OUTPATIENT
Start: 2023-11-30

## 2023-11-30 NOTE — PROGRESS NOTES
The patient location is: Home  The chief complaint leading to consultation is: Review of labs    Visit type: audiovisual    Face to Face time with patient: 20 minutes of total time spent on the encounter, which includes face to face time and non-face to face time preparing to see the patient (eg, review of tests), Obtaining and/or reviewing separately obtained history, Documenting clinical information in the electronic or other health record, Independently interpreting results (not separately reported) and communicating results to the patient/family/caregiver, or Care coordination (not separately reported).     Each patient to whom he or she provides medical services by telemedicine is:  (1) informed of the relationship between the physician and patient and the respective role of any other health care provider with respect to management of the patient; and (2) notified that he or she may decline to receive medical services by telemedicine and may withdraw from such care at any time.    History of present illness:  Ms. Polo is a 73-year-old female of Dr. Santiago's known for iron deficiency anemia who has completed IV iron replacement (05/2019, 05/2020, 05/2022 & recently 04/2023).      To note:  UGI/Colonoscopy done in 05/2020 - will follow with Dr. Hsieh in 05/2023.    She presents via VV to review labs with complaints of increasing fatigue.  She denies shortness of breath, CP, palpitations, bleeding, melena, pica, etc.  She remains active and in overall good health.     Requests a refill on Valium.    Physical examination:  Well-developed, well-nourished, white female, in no acute distress, Alert and oriented x4.  Respiratory effort is normal.  Physical exam is deferred as this is a virtual encounter.    Lab Results   Component Value Date    WBC 3.94 11/27/2023    RBC 3.37 (L) 11/27/2023    HGB 10.4 (L) 11/27/2023    HCT 32.4 (L) 11/27/2023    MCV 96 11/27/2023    MCH 30.9 11/27/2023    MCHC 32.1 11/27/2023     RDW 12.2 11/27/2023     11/27/2023    MPV 9.5 11/27/2023    GRAN 2.5 11/27/2023    GRAN 64.1 11/27/2023    LYMPH 0.9 (L) 11/27/2023    LYMPH 23.9 11/27/2023    MONO 0.3 11/27/2023    MONO 7.9 11/27/2023    EOS 0.1 11/27/2023    BASO 0.03 11/27/2023    EOSINOPHIL 3.0 11/27/2023    BASOPHIL 0.8 11/27/2023        Lab Results   Component Value Date    IRON 32 11/27/2023    TRANSFERRIN 335 11/27/2023    TIBC 469 (H) 11/27/2023    LABIRON 14 (L) 04/30/2019    FESATURATED 7 (L) 11/27/2023      Lab Results   Component Value Date    FERRITIN 7 (L) 11/27/2023     sTFR = 2.1       Impression:  1. Recurring iron deficiency anemia - symptomatic  2. Generalized anxiety disorder - requested refill of Vallium    Plan:  1.  Arrange for Feraheme weekly x 2 @ [facility] with auth.  2.  F/U in 3 months with CBC, iron studies/ferritin (lab at [facility])  3.  Rx Valium refilled.    20 minutes were spent in coordination of patient's care, record review and counseling.    Route Chart for Scheduling    Med Onc Chart Routing      Follow up with physician    Follow up with BRAYAN 3 months. f/u in 3 months VV with labs prior in [facility]:  CBC, Iron studies/ferritin   Infusion scheduling note   Feraheme weekly x 2 in [facility] infusion   Injection scheduling note    Labs    Imaging    Pharmacy appointment    Other referrals                  Therapy Plan Information  Medications  FERUMOXYTOL 510 MG/117 ML D5W (READY TO MIX SYSTEM) IVPB 510 mg  510 mg, Intravenous, Every visit  Anaphylaxis/Hypersensitivity  EPINEPHrine (EPIPEN) 0.3 mg/0.3 mL pen injection 0.3 mg  0.3 mg, Intramuscular, Every visit  diphenhydrAMINE injection 50 mg  50 mg, Intravenous, Every visit  hydrocortisone sodium succinate injection 100 mg  100 mg, Intravenous, Every visit  Flushes  heparin, porcine (PF) 100 unit/mL injection flush 500 Units  500 Units, Intravenous, Every visit  sodium chloride 0.9% flush 10 mL  10 mL,  Intravenous, Every visit  sodium chloride 0.9% 100 mL flush bag  Intravenous, Every visit             Answers submitted by the patient for this visit:  Review of Systems Questionnaire (Submitted on 11/24/2023)  appetite change : No  unexpected weight change: No  mouth sores: No  visual disturbance: No  cough: No  shortness of breath: No  chest pain: No  abdominal pain: No  diarrhea: No  frequency: No  back pain: No  rash: No  headaches: No  adenopathy: No  nervous/ anxious: No

## 2023-12-11 ENCOUNTER — INFUSION (OUTPATIENT)
Dept: INFUSION THERAPY | Facility: HOSPITAL | Age: 73
End: 2023-12-11
Attending: NURSE PRACTITIONER
Payer: MEDICARE

## 2023-12-11 VITALS
DIASTOLIC BLOOD PRESSURE: 61 MMHG | WEIGHT: 143.19 LBS | HEIGHT: 63 IN | BODY MASS INDEX: 25.37 KG/M2 | SYSTOLIC BLOOD PRESSURE: 102 MMHG | RESPIRATION RATE: 15 BRPM | OXYGEN SATURATION: 99 % | HEART RATE: 70 BPM | TEMPERATURE: 98 F

## 2023-12-11 DIAGNOSIS — D50.9 IRON DEFICIENCY ANEMIA, UNSPECIFIED IRON DEFICIENCY ANEMIA TYPE: Primary | ICD-10-CM

## 2023-12-11 PROCEDURE — 25000003 PHARM REV CODE 250: Performed by: NURSE PRACTITIONER

## 2023-12-11 PROCEDURE — 96365 THER/PROPH/DIAG IV INF INIT: CPT

## 2023-12-11 PROCEDURE — 63600175 PHARM REV CODE 636 W HCPCS: Mod: JZ | Performed by: NURSE PRACTITIONER

## 2023-12-11 RX ORDER — SODIUM CHLORIDE 0.9 % (FLUSH) 0.9 %
10 SYRINGE (ML) INJECTION
Status: CANCELLED | OUTPATIENT
Start: 2023-12-11

## 2023-12-11 RX ORDER — SODIUM CHLORIDE 0.9 % (FLUSH) 0.9 %
10 SYRINGE (ML) INJECTION
Status: DISCONTINUED | OUTPATIENT
Start: 2023-12-11 | End: 2023-12-11 | Stop reason: HOSPADM

## 2023-12-11 RX ORDER — EPINEPHRINE 0.3 MG/.3ML
0.3 INJECTION SUBCUTANEOUS ONCE AS NEEDED
Status: CANCELLED | OUTPATIENT
Start: 2023-12-11

## 2023-12-11 RX ORDER — HEPARIN 100 UNIT/ML
500 SYRINGE INTRAVENOUS
Status: CANCELLED | OUTPATIENT
Start: 2023-12-11

## 2023-12-11 RX ORDER — DIPHENHYDRAMINE HYDROCHLORIDE 50 MG/ML
50 INJECTION INTRAMUSCULAR; INTRAVENOUS ONCE AS NEEDED
Status: CANCELLED | OUTPATIENT
Start: 2023-12-11

## 2023-12-11 RX ADMIN — FERUMOXYTOL 510 MG: 510 INJECTION INTRAVENOUS at 09:12

## 2023-12-11 NOTE — PROGRESS NOTES
PT tolerated Feraheme infusion well. No adverse reaction noted. Pt education reinforced on Feraheme side effects, what to expect and when to call Dr. Walt Felix. Pt verbalized understanding.  PIV d/c per protocol, pt tolerated well.  amr

## 2023-12-11 NOTE — PLAN OF CARE
Problem: Adult Inpatient Plan of Care  Goal: Optimal Comfort and Wellbeing  Outcome: Ongoing, Progressing  Intervention: Provide Person-Centered Care  Flowsheets (Taken 12/11/2023 0920)  Trust Relationship/Rapport:   care explained   reassurance provided   choices provided   thoughts/feelings acknowledged   empathic listening provided   questions answered   questions encouraged   emotional support provided

## 2023-12-18 ENCOUNTER — INFUSION (OUTPATIENT)
Dept: INFUSION THERAPY | Facility: HOSPITAL | Age: 73
End: 2023-12-18
Attending: NURSE PRACTITIONER
Payer: MEDICARE

## 2023-12-18 VITALS
DIASTOLIC BLOOD PRESSURE: 74 MMHG | SYSTOLIC BLOOD PRESSURE: 122 MMHG | OXYGEN SATURATION: 97 % | TEMPERATURE: 98 F | HEART RATE: 67 BPM | RESPIRATION RATE: 15 BRPM

## 2023-12-18 DIAGNOSIS — D50.9 IRON DEFICIENCY ANEMIA, UNSPECIFIED IRON DEFICIENCY ANEMIA TYPE: Primary | ICD-10-CM

## 2023-12-18 PROCEDURE — 96365 THER/PROPH/DIAG IV INF INIT: CPT

## 2023-12-18 PROCEDURE — 63600175 PHARM REV CODE 636 W HCPCS: Mod: JZ | Performed by: NURSE PRACTITIONER

## 2023-12-18 PROCEDURE — 25000003 PHARM REV CODE 250: Performed by: NURSE PRACTITIONER

## 2023-12-18 PROCEDURE — A4216 STERILE WATER/SALINE, 10 ML: HCPCS | Performed by: NURSE PRACTITIONER

## 2023-12-18 RX ORDER — SODIUM CHLORIDE 0.9 % (FLUSH) 0.9 %
10 SYRINGE (ML) INJECTION
Status: CANCELLED | OUTPATIENT
Start: 2023-12-18

## 2023-12-18 RX ORDER — DIPHENHYDRAMINE HYDROCHLORIDE 50 MG/ML
50 INJECTION INTRAMUSCULAR; INTRAVENOUS ONCE AS NEEDED
OUTPATIENT
Start: 2023-12-18

## 2023-12-18 RX ORDER — DIPHENHYDRAMINE HYDROCHLORIDE 50 MG/ML
50 INJECTION INTRAMUSCULAR; INTRAVENOUS ONCE AS NEEDED
Status: DISCONTINUED | OUTPATIENT
Start: 2023-12-18 | End: 2023-12-18 | Stop reason: HOSPADM

## 2023-12-18 RX ORDER — EPINEPHRINE 0.3 MG/.3ML
0.3 INJECTION SUBCUTANEOUS ONCE AS NEEDED
OUTPATIENT
Start: 2023-12-18

## 2023-12-18 RX ORDER — EPINEPHRINE 0.3 MG/.3ML
0.3 INJECTION SUBCUTANEOUS ONCE AS NEEDED
Status: DISCONTINUED | OUTPATIENT
Start: 2023-12-18 | End: 2023-12-18 | Stop reason: HOSPADM

## 2023-12-18 RX ORDER — HEPARIN 100 UNIT/ML
500 SYRINGE INTRAVENOUS
OUTPATIENT
Start: 2023-12-18

## 2023-12-18 RX ORDER — SODIUM CHLORIDE 0.9 % (FLUSH) 0.9 %
10 SYRINGE (ML) INJECTION
Status: DISCONTINUED | OUTPATIENT
Start: 2023-12-18 | End: 2023-12-18 | Stop reason: HOSPADM

## 2023-12-18 RX ADMIN — FERUMOXYTOL 510 MG: 510 INJECTION INTRAVENOUS at 09:12

## 2023-12-18 RX ADMIN — Medication 10 ML: at 10:12

## 2023-12-18 RX ADMIN — SODIUM CHLORIDE: 9 INJECTION, SOLUTION INTRAVENOUS at 09:12

## 2023-12-18 NOTE — PLAN OF CARE
Problem: Adult Inpatient Plan of Care  Goal: Optimal Comfort and Wellbeing  Outcome: Ongoing, Progressing  Intervention: Provide Person-Centered Care  Flowsheets (Taken 12/18/2023 1023)  Trust Relationship/Rapport:   care explained   reassurance provided   thoughts/feelings acknowledged   emotional support provided   choices provided   empathic listening provided   questions encouraged   questions answered

## 2024-02-26 ENCOUNTER — LAB VISIT (OUTPATIENT)
Dept: LAB | Facility: HOSPITAL | Age: 74
End: 2024-02-26
Attending: NURSE PRACTITIONER
Payer: MEDICARE

## 2024-02-26 DIAGNOSIS — D50.9 IRON DEFICIENCY ANEMIA, UNSPECIFIED IRON DEFICIENCY ANEMIA TYPE: ICD-10-CM

## 2024-02-26 LAB
BASOPHILS # BLD AUTO: 0.03 K/UL (ref 0–0.2)
BASOPHILS NFR BLD: 0.6 % (ref 0–1.9)
DIFFERENTIAL METHOD BLD: ABNORMAL
EOSINOPHIL # BLD AUTO: 0.2 K/UL (ref 0–0.5)
EOSINOPHIL NFR BLD: 3.5 % (ref 0–8)
ERYTHROCYTE [DISTWIDTH] IN BLOOD BY AUTOMATED COUNT: 13.7 % (ref 11.5–14.5)
FERRITIN SERPL-MCNC: 17 NG/ML (ref 20–300)
HCT VFR BLD AUTO: 36 % (ref 37–48.5)
HGB BLD-MCNC: 12 G/DL (ref 12–16)
IMM GRANULOCYTES # BLD AUTO: 0.01 K/UL (ref 0–0.04)
IMM GRANULOCYTES NFR BLD AUTO: 0.2 % (ref 0–0.5)
IRON SERPL-MCNC: 75 UG/DL (ref 30–160)
LYMPHOCYTES # BLD AUTO: 1.4 K/UL (ref 1–4.8)
LYMPHOCYTES NFR BLD: 28.2 % (ref 18–48)
MCH RBC QN AUTO: 31.4 PG (ref 27–31)
MCHC RBC AUTO-ENTMCNC: 33.3 G/DL (ref 32–36)
MCV RBC AUTO: 94 FL (ref 82–98)
MONOCYTES # BLD AUTO: 0.5 K/UL (ref 0.3–1)
MONOCYTES NFR BLD: 10.6 % (ref 4–15)
NEUTROPHILS # BLD AUTO: 2.8 K/UL (ref 1.8–7.7)
NEUTROPHILS NFR BLD: 56.9 % (ref 38–73)
NRBC BLD-RTO: 0 /100 WBC
PLATELET # BLD AUTO: 264 K/UL (ref 150–450)
PMV BLD AUTO: 9.6 FL (ref 9.2–12.9)
RBC # BLD AUTO: 3.82 M/UL (ref 4–5.4)
SATURATED IRON: 19 % (ref 20–50)
TOTAL IRON BINDING CAPACITY: 395 UG/DL (ref 250–450)
TRANSFERRIN SERPL-MCNC: 282 MG/DL (ref 200–375)
WBC # BLD AUTO: 4.89 K/UL (ref 3.9–12.7)

## 2024-02-26 PROCEDURE — 83540 ASSAY OF IRON: CPT | Performed by: NURSE PRACTITIONER

## 2024-02-26 PROCEDURE — 82728 ASSAY OF FERRITIN: CPT | Performed by: NURSE PRACTITIONER

## 2024-02-26 PROCEDURE — 36415 COLL VENOUS BLD VENIPUNCTURE: CPT | Performed by: NURSE PRACTITIONER

## 2024-02-26 PROCEDURE — 85025 COMPLETE CBC W/AUTO DIFF WBC: CPT | Performed by: NURSE PRACTITIONER

## 2024-02-29 ENCOUNTER — OFFICE VISIT (OUTPATIENT)
Dept: HEMATOLOGY/ONCOLOGY | Facility: CLINIC | Age: 74
End: 2024-02-29
Payer: MEDICARE

## 2024-02-29 DIAGNOSIS — D50.9 IRON DEFICIENCY ANEMIA, UNSPECIFIED IRON DEFICIENCY ANEMIA TYPE: Primary | ICD-10-CM

## 2024-02-29 PROCEDURE — 99213 OFFICE O/P EST LOW 20 MIN: CPT | Mod: 95,,, | Performed by: NURSE PRACTITIONER

## 2024-02-29 RX ORDER — DIAZEPAM 5 MG/1
10 TABLET ORAL EVERY 8 HOURS PRN
Qty: 30 TABLET | Refills: 0 | Status: SHIPPED | OUTPATIENT
Start: 2024-02-29 | End: 2025-02-28

## 2024-02-29 NOTE — PROGRESS NOTES
The patient location is: Home  The chief complaint leading to consultation is: Review of labs    Visit type: audiovisual    Face to Face time with patient: 20 minutes of total time spent on the encounter, which includes face to face time and non-face to face time preparing to see the patient (eg, review of tests), Obtaining and/or reviewing separately obtained history, Documenting clinical information in the electronic or other health record, Independently interpreting results (not separately reported) and communicating results to the patient/family/caregiver, or Care coordination (not separately reported).     Each patient to whom he or she provides medical services by telemedicine is:  (1) informed of the relationship between the physician and patient and the respective role of any other health care provider with respect to management of the patient; and (2) notified that he or she may decline to receive medical services by telemedicine and may withdraw from such care at any time.    History of present illness:  Ms. Polo is a 73-year-old female of Dr. Santiago's known for iron deficiency anemia who has completed IV iron replacement (05/2019, 05/2020, 05/2022 & recently 04/2023).      To note:  UGI/Colonoscopy done in 05/2020    Feraheme 510 mg 12/11 & 12/18/23    Today:  02/20/24  Ms. Polo presents via VV with her , Alec Polo, to review labs post recent Feraheme in December 23.    No complains.  Remains active & well. She denies shortness of breath, CP, palpitations, bleeding, melena, pica, etc.  Requests a refill on Valium.    Physical examination:  Well-developed, well-nourished, white female, in no acute distress, Alert and oriented x4.  Respiratory effort is normal.  Physical exam is deferred as this is a virtual encounter.    Lab Results   Component Value Date    WBC 4.89 02/26/2024    RBC 3.82 (L) 02/26/2024    HGB 12.0 02/26/2024    HCT 36.0 (L) 02/26/2024    MCV 94 02/26/2024    MCH 31.4  (H) 02/26/2024    MCHC 33.3 02/26/2024    RDW 13.7 02/26/2024     02/26/2024    MPV 9.6 02/26/2024    GRAN 2.8 02/26/2024    GRAN 56.9 02/26/2024    LYMPH 1.4 02/26/2024    LYMPH 28.2 02/26/2024    MONO 0.5 02/26/2024    MONO 10.6 02/26/2024    EOS 0.2 02/26/2024    BASO 0.03 02/26/2024    EOSINOPHIL 3.5 02/26/2024    BASOPHIL 0.6 02/26/2024        Lab Results   Component Value Date    IRON 75 02/26/2024    TRANSFERRIN 282 02/26/2024    TIBC 395 02/26/2024    LABIRON 14 (L) 04/30/2019    FESATURATED 19 (L) 02/26/2024      Lab Results   Component Value Date    FERRITIN 17 (L) 02/26/2024     Impression:  1. Recurring iron deficiency anemia - symptomatic  2. Generalized anxiety disorder - requested refill of Vallium    Plan:  1.  F/U in 6 months with CBC, iron studies/ferritin (lab at [facility])  2.  Rx Valium refilled.    20 minutes were spent in coordination of patient's care, record review and counseling.    Route Chart for Scheduling    Med Onc Chart Routing      Follow up with physician    Follow up with BRAYAN 6 months. f/u in 6 months with CBC, Iron studies/ferritin prior in [facility]   Infusion scheduling note    Injection scheduling note    Labs    Imaging    Pharmacy appointment    Other referrals                  Therapy Plan Information  Anaphylaxis/Hypersensitivity  EPINEPHrine (EPIPEN) 0.3 mg/0.3 mL pen injection 0.3 mg  0.3 mg, Intramuscular, Every visit  diphenhydrAMINE injection 50 mg  50 mg, Intravenous, Every visit  hydrocortisone sodium succinate injection 100 mg  100 mg, Intravenous, Every visit  Flushes  heparin, porcine (PF) 100 unit/mL injection flush 500 Units  500 Units, Intravenous, Every visit                Answers submitted by the patient for this visit:  Review of Systems Questionnaire (Submitted on 2/26/2024)  appetite change : No  unexpected weight change: No  mouth sores: No  visual disturbance: No  cough: No  shortness of breath: No  chest pain: No  abdominal  pain: No  diarrhea: No  frequency: No  back pain: No  rash: No  headaches: No  adenopathy: No  nervous/ anxious: No

## 2024-07-31 ENCOUNTER — PATIENT MESSAGE (OUTPATIENT)
Dept: HEMATOLOGY/ONCOLOGY | Facility: CLINIC | Age: 74
End: 2024-07-31
Payer: MEDICARE

## 2024-07-31 DIAGNOSIS — D50.9 IRON DEFICIENCY ANEMIA, UNSPECIFIED IRON DEFICIENCY ANEMIA TYPE: Primary | ICD-10-CM

## 2024-07-31 DIAGNOSIS — E07.9 DISORDER OF THYROID: ICD-10-CM

## 2024-08-12 ENCOUNTER — PATIENT MESSAGE (OUTPATIENT)
Dept: HEMATOLOGY/ONCOLOGY | Facility: CLINIC | Age: 74
End: 2024-08-12
Payer: MEDICARE

## 2024-08-26 ENCOUNTER — LAB VISIT (OUTPATIENT)
Dept: LAB | Facility: HOSPITAL | Age: 74
End: 2024-08-26
Attending: NURSE PRACTITIONER
Payer: MEDICARE

## 2024-08-26 ENCOUNTER — TELEPHONE (OUTPATIENT)
Dept: HEMATOLOGY/ONCOLOGY | Facility: CLINIC | Age: 74
End: 2024-08-26
Payer: MEDICARE

## 2024-08-26 DIAGNOSIS — E07.9 DISORDER OF THYROID: ICD-10-CM

## 2024-08-26 DIAGNOSIS — D50.9 IRON DEFICIENCY ANEMIA, UNSPECIFIED IRON DEFICIENCY ANEMIA TYPE: Primary | ICD-10-CM

## 2024-08-26 DIAGNOSIS — D50.9 IRON DEFICIENCY ANEMIA, UNSPECIFIED IRON DEFICIENCY ANEMIA TYPE: ICD-10-CM

## 2024-08-26 LAB
ALBUMIN SERPL BCP-MCNC: 3.9 G/DL (ref 3.5–5.2)
ALP SERPL-CCNC: 87 U/L (ref 55–135)
ALT SERPL W/O P-5'-P-CCNC: 19 U/L (ref 10–44)
ANION GAP SERPL CALC-SCNC: 7 MMOL/L (ref 8–16)
AST SERPL-CCNC: 18 U/L (ref 10–40)
BASOPHILS # BLD AUTO: 0.03 K/UL (ref 0–0.2)
BASOPHILS NFR BLD: 0.6 % (ref 0–1.9)
BILIRUB SERPL-MCNC: 0.2 MG/DL (ref 0.1–1)
BUN SERPL-MCNC: 10 MG/DL (ref 8–23)
CALCIUM SERPL-MCNC: 9.3 MG/DL (ref 8.7–10.5)
CHLORIDE SERPL-SCNC: 105 MMOL/L (ref 95–110)
CO2 SERPL-SCNC: 24 MMOL/L (ref 23–29)
CREAT SERPL-MCNC: 0.7 MG/DL (ref 0.5–1.4)
DIFFERENTIAL METHOD BLD: ABNORMAL
EOSINOPHIL # BLD AUTO: 0.1 K/UL (ref 0–0.5)
EOSINOPHIL NFR BLD: 1.5 % (ref 0–8)
ERYTHROCYTE [DISTWIDTH] IN BLOOD BY AUTOMATED COUNT: 15.2 % (ref 11.5–14.5)
EST. GFR  (NO RACE VARIABLE): >60 ML/MIN/1.73 M^2
FERRITIN SERPL-MCNC: 6 NG/ML (ref 20–300)
GLUCOSE SERPL-MCNC: 98 MG/DL (ref 70–110)
HCT VFR BLD AUTO: 28.7 % (ref 37–48.5)
HGB BLD-MCNC: 9 G/DL (ref 12–16)
IMM GRANULOCYTES # BLD AUTO: 0.01 K/UL (ref 0–0.04)
IMM GRANULOCYTES NFR BLD AUTO: 0.2 % (ref 0–0.5)
IRON SERPL-MCNC: 27 UG/DL (ref 30–160)
LDH SERPL L TO P-CCNC: 152 U/L (ref 110–260)
LYMPHOCYTES # BLD AUTO: 1.1 K/UL (ref 1–4.8)
LYMPHOCYTES NFR BLD: 21 % (ref 18–48)
MCH RBC QN AUTO: 26.9 PG (ref 27–31)
MCHC RBC AUTO-ENTMCNC: 31.4 G/DL (ref 32–36)
MCV RBC AUTO: 86 FL (ref 82–98)
MONOCYTES # BLD AUTO: 0.5 K/UL (ref 0.3–1)
MONOCYTES NFR BLD: 8.9 % (ref 4–15)
NEUTROPHILS # BLD AUTO: 3.6 K/UL (ref 1.8–7.7)
NEUTROPHILS NFR BLD: 67.8 % (ref 38–73)
NRBC BLD-RTO: 0 /100 WBC
PLATELET # BLD AUTO: 335 K/UL (ref 150–450)
PMV BLD AUTO: 9.1 FL (ref 9.2–12.9)
POTASSIUM SERPL-SCNC: 4.3 MMOL/L (ref 3.5–5.1)
PROT SERPL-MCNC: 6.5 G/DL (ref 6–8.4)
RBC # BLD AUTO: 3.34 M/UL (ref 4–5.4)
SATURATED IRON: 5 % (ref 20–50)
SODIUM SERPL-SCNC: 136 MMOL/L (ref 136–145)
T4 FREE SERPL-MCNC: 0.72 NG/DL (ref 0.71–1.51)
TOTAL IRON BINDING CAPACITY: 522 UG/DL (ref 250–450)
TRANSFERRIN SERPL-MCNC: 373 MG/DL (ref 200–375)
TSH SERPL DL<=0.005 MIU/L-ACNC: 2.84 UIU/ML (ref 0.4–4)
WBC # BLD AUTO: 5.29 K/UL (ref 3.9–12.7)

## 2024-08-26 PROCEDURE — 83615 LACTATE (LD) (LDH) ENZYME: CPT | Performed by: NURSE PRACTITIONER

## 2024-08-26 PROCEDURE — 80053 COMPREHEN METABOLIC PANEL: CPT | Performed by: NURSE PRACTITIONER

## 2024-08-26 PROCEDURE — 36415 COLL VENOUS BLD VENIPUNCTURE: CPT | Performed by: NURSE PRACTITIONER

## 2024-08-26 PROCEDURE — 83540 ASSAY OF IRON: CPT | Performed by: NURSE PRACTITIONER

## 2024-08-26 PROCEDURE — 82728 ASSAY OF FERRITIN: CPT | Performed by: NURSE PRACTITIONER

## 2024-08-26 PROCEDURE — 84439 ASSAY OF FREE THYROXINE: CPT | Performed by: NURSE PRACTITIONER

## 2024-08-26 PROCEDURE — 85025 COMPLETE CBC W/AUTO DIFF WBC: CPT | Performed by: NURSE PRACTITIONER

## 2024-08-26 PROCEDURE — 84443 ASSAY THYROID STIM HORMONE: CPT | Performed by: NURSE PRACTITIONER

## 2024-08-26 NOTE — TELEPHONE ENCOUNTER
----- Message from Cherry Curtis sent at 8/26/2024  9:24 AM CDT -----  Type: Needs Medical Advice  Who Called:  Dr. Teo Polo (spouse)  Symptoms (please be specific):    How long has patient had these symptoms:    Pharmacy name and phone #:    Best Call Back Number:   Additional Information: Christ Gutiérrez is requesting a call back from CHEN Felix to add an LDH to his wife lab orders.

## 2024-08-27 LAB — T3 SERPL IA-MCNC: 76 NG/DL (ref 80–200)

## 2024-08-29 ENCOUNTER — OFFICE VISIT (OUTPATIENT)
Dept: HEMATOLOGY/ONCOLOGY | Facility: CLINIC | Age: 74
End: 2024-08-29
Payer: MEDICARE

## 2024-08-29 DIAGNOSIS — D50.9 IRON DEFICIENCY ANEMIA, UNSPECIFIED IRON DEFICIENCY ANEMIA TYPE: ICD-10-CM

## 2024-08-29 DIAGNOSIS — D50.9 IRON DEFICIENCY ANEMIA, UNSPECIFIED IRON DEFICIENCY ANEMIA TYPE: Primary | ICD-10-CM

## 2024-08-29 RX ORDER — SODIUM CHLORIDE 0.9 % (FLUSH) 0.9 %
10 SYRINGE (ML) INJECTION
OUTPATIENT
Start: 2024-08-29

## 2024-08-29 RX ORDER — HEPARIN 100 UNIT/ML
500 SYRINGE INTRAVENOUS
OUTPATIENT
Start: 2024-08-29

## 2024-08-29 RX ORDER — DIAZEPAM 5 MG/1
10 TABLET ORAL EVERY 8 HOURS PRN
Qty: 30 TABLET | Refills: 0 | Status: SHIPPED | OUTPATIENT
Start: 2024-08-29 | End: 2025-08-29

## 2024-08-29 RX ORDER — EPINEPHRINE 0.3 MG/.3ML
0.3 INJECTION SUBCUTANEOUS ONCE AS NEEDED
OUTPATIENT
Start: 2024-08-29

## 2024-08-29 RX ORDER — DIPHENHYDRAMINE HYDROCHLORIDE 50 MG/ML
50 INJECTION INTRAMUSCULAR; INTRAVENOUS ONCE AS NEEDED
OUTPATIENT
Start: 2024-08-29

## 2024-08-29 RX ORDER — DIAZEPAM 5 MG/1
10 TABLET ORAL EVERY 8 HOURS PRN
Qty: 30 TABLET | Refills: 0 | OUTPATIENT
Start: 2024-08-29 | End: 2025-08-29

## 2024-08-29 NOTE — PROGRESS NOTES
The patient location is: Home  The chief complaint leading to consultation is: Review of labs    Visit type: audiovisual    Face to Face time with patient: 20 minutes of total time spent on the encounter, which includes face to face time and non-face to face time preparing to see the patient (eg, review of tests), Obtaining and/or reviewing separately obtained history, Documenting clinical information in the electronic or other health record, Independently interpreting results (not separately reported) and communicating results to the patient/family/caregiver, or Care coordination (not separately reported).     Each patient to whom he or she provides medical services by telemedicine is:  (1) informed of the relationship between the physician and patient and the respective role of any other health care provider with respect to management of the patient; and (2) notified that he or she may decline to receive medical services by telemedicine and may withdraw from such care at any time.    History of present illness:  Ms. Polo is a 73-year-old female of Dr. Santiago's known for iron deficiency anemia who has completed IV iron replacement (05/2019, 05/2020, 05/2022 & recently 04/2023).      To note:  UGI/Colonoscopy done in 05/2020    Feraheme 510 mg 12/11 & 12/18/23    Today:  08/29/24  Ms. Polo presents via VV to review labs from 08/26.  Last scopes done in 2020.  Discussed continued drop in hgb & iron indices.  Will arrange for replacement & patient will call to arrange Colonoscopy with Dr. Hsieh  No complaints.  Remains active & well. Takes a Woman's MVI with iron for ladies over 50. Eats meat & foods with iron.   She denies shortness of breath, CP, palpitations, bleeding, melena, pica, etc.  Anxiety controlled with Valium; watching children & carrying on ADL's without increased anxiety.  Requests a refill on Valium.    Physical examination:  Well-developed, well-nourished, white female, in no acute  distress, Alert and oriented x4.  Respiratory effort is normal.  Physical exam is deferred as this is a virtual encounter.    Lab Results   Component Value Date    WBC 5.29 08/26/2024    RBC 3.34 (L) 08/26/2024    HGB 9.0 (L) 08/26/2024    HCT 28.7 (L) 08/26/2024    MCV 86 08/26/2024    MCH 26.9 (L) 08/26/2024    MCHC 31.4 (L) 08/26/2024    RDW 15.2 (H) 08/26/2024     08/26/2024    MPV 9.1 (L) 08/26/2024    GRAN 3.6 08/26/2024    GRAN 67.8 08/26/2024    LYMPH 1.1 08/26/2024    LYMPH 21.0 08/26/2024    MONO 0.5 08/26/2024    MONO 8.9 08/26/2024    EOS 0.1 08/26/2024    BASO 0.03 08/26/2024    EOSINOPHIL 1.5 08/26/2024    BASOPHIL 0.6 08/26/2024        Lab Results   Component Value Date    IRON 27 (L) 08/26/2024    TRANSFERRIN 373 08/26/2024    TIBC 522 (H) 08/26/2024    LABIRON 14 (L) 04/30/2019    FESATURATED 5 (L) 08/26/2024      Lab Results   Component Value Date    FERRITIN 6 (L) 08/26/2024     Impression:  1. Recurring iron deficiency anemia - symptomatic  2. Generalized anxiety disorder - requested refill of Vallium    Plan:  1.  Arrange for Feraheme weekly x 2 in [place]; Patient to arrange for f/u Colonoscopy with Dr. Hsieh. F/U in clinic in 4 months with CBC, Iron studies/ferritin prior ([place])  2.  Rx Valium refilled.       YESSENIA Mercedes, FNP-C  [facility]  20 minutes were spent in coordination of patient's care, record review and counseling.    Route Chart for Scheduling    Med Onc Chart Routing      Follow up with physician    Follow up with BRAYAN 4 months. f/u in 4 months with labs prior in [place]:  CBC, Iron studies/ferritin   Infusion scheduling note New or changed treatment   Fereheme weekly x 2 in [place]   Injection scheduling note    Labs    Imaging    Pharmacy appointment    Other referrals                  Therapy Plan Information  FERAHEME (FERUMOXYTOL) TWO DOSES for EDITA (iron deficiency anemia), noted on  12/26/2017  Anaphylaxis/Hypersensitivity  EPINEPHrine (EPIPEN) 0.3 mg/0.3 mL pen injection 0.3 mg  0.3 mg, Intramuscular, Every visit  diphenhydrAMINE injection 50 mg  50 mg, Intravenous, Every visit  hydrocortisone sodium succinate injection 100 mg  100 mg, Intravenous, Every visit  Flushes  heparin, porcine (PF) 100 unit/mL injection flush 500 Units  500 Units, Intravenous, Every visit      No therapy plan of the specified type found.    No therapy plan of the specified type found.       Answers submitted by the patient for this visit:  Review of Systems Questionnaire (Submitted on 8/22/2024)  appetite change : No  unexpected weight change: No  mouth sores: No  visual disturbance: No  cough: No  shortness of breath: No  chest pain: No  abdominal pain: No  diarrhea: No  frequency: No  back pain: No  rash: No  headaches: No  adenopathy: No  nervous/ anxious: No

## 2024-09-06 ENCOUNTER — INFUSION (OUTPATIENT)
Dept: INFUSION THERAPY | Facility: HOSPITAL | Age: 74
End: 2024-09-06
Attending: NURSE PRACTITIONER
Payer: MEDICARE

## 2024-09-06 VITALS
TEMPERATURE: 98 F | OXYGEN SATURATION: 100 % | BODY MASS INDEX: 25.28 KG/M2 | DIASTOLIC BLOOD PRESSURE: 67 MMHG | HEIGHT: 63 IN | RESPIRATION RATE: 16 BRPM | SYSTOLIC BLOOD PRESSURE: 119 MMHG | WEIGHT: 142.69 LBS | HEART RATE: 69 BPM

## 2024-09-06 DIAGNOSIS — D50.9 IRON DEFICIENCY ANEMIA, UNSPECIFIED IRON DEFICIENCY ANEMIA TYPE: Primary | ICD-10-CM

## 2024-09-06 PROCEDURE — 96374 THER/PROPH/DIAG INJ IV PUSH: CPT

## 2024-09-06 PROCEDURE — 25000003 PHARM REV CODE 250: Performed by: NURSE PRACTITIONER

## 2024-09-06 PROCEDURE — 63600175 PHARM REV CODE 636 W HCPCS: Mod: JZ,JG | Performed by: NURSE PRACTITIONER

## 2024-09-06 RX ORDER — DIPHENHYDRAMINE HYDROCHLORIDE 50 MG/ML
50 INJECTION INTRAMUSCULAR; INTRAVENOUS ONCE AS NEEDED
OUTPATIENT
Start: 2024-09-06

## 2024-09-06 RX ORDER — SODIUM CHLORIDE 0.9 % (FLUSH) 0.9 %
10 SYRINGE (ML) INJECTION
OUTPATIENT
Start: 2024-09-06

## 2024-09-06 RX ORDER — EPINEPHRINE 0.3 MG/.3ML
0.3 INJECTION SUBCUTANEOUS ONCE AS NEEDED
OUTPATIENT
Start: 2024-09-06

## 2024-09-06 RX ORDER — SODIUM CHLORIDE 0.9 % (FLUSH) 0.9 %
10 SYRINGE (ML) INJECTION
Status: DISCONTINUED | OUTPATIENT
Start: 2024-09-06 | End: 2024-09-06 | Stop reason: HOSPADM

## 2024-09-06 RX ORDER — HEPARIN 100 UNIT/ML
500 SYRINGE INTRAVENOUS
OUTPATIENT
Start: 2024-09-06

## 2024-09-06 RX ADMIN — SODIUM CHLORIDE: 9 INJECTION, SOLUTION INTRAVENOUS at 07:09

## 2024-09-06 RX ADMIN — FERUMOXYTOL 510 MG: 510 INJECTION INTRAVENOUS at 07:09

## 2024-09-06 NOTE — PLAN OF CARE
Problem: Adult Inpatient Plan of Care  Goal: Optimal Comfort and Wellbeing  Outcome: Progressing  Intervention: Provide Person-Centered Care  Flowsheets (Taken 9/6/2024 9013)  Trust Relationship/Rapport:   care explained   thoughts/feelings acknowledged   choices provided   emotional support provided   empathic listening provided   questions answered   questions encouraged   reassurance provided

## 2024-09-18 ENCOUNTER — INFUSION (OUTPATIENT)
Dept: INFUSION THERAPY | Facility: HOSPITAL | Age: 74
End: 2024-09-18
Attending: NURSE PRACTITIONER
Payer: MEDICARE

## 2024-09-18 VITALS
WEIGHT: 141.44 LBS | BODY MASS INDEX: 25.05 KG/M2 | OXYGEN SATURATION: 99 % | DIASTOLIC BLOOD PRESSURE: 73 MMHG | TEMPERATURE: 96 F | SYSTOLIC BLOOD PRESSURE: 117 MMHG | HEART RATE: 60 BPM

## 2024-09-18 DIAGNOSIS — D50.9 IRON DEFICIENCY ANEMIA, UNSPECIFIED IRON DEFICIENCY ANEMIA TYPE: Primary | ICD-10-CM

## 2024-09-18 PROCEDURE — 96374 THER/PROPH/DIAG INJ IV PUSH: CPT

## 2024-09-18 PROCEDURE — 25000003 PHARM REV CODE 250: Performed by: NURSE PRACTITIONER

## 2024-09-18 PROCEDURE — 63600175 PHARM REV CODE 636 W HCPCS: Mod: JZ,JG | Performed by: NURSE PRACTITIONER

## 2024-09-18 RX ORDER — HEPARIN 100 UNIT/ML
500 SYRINGE INTRAVENOUS
OUTPATIENT
Start: 2024-09-18

## 2024-09-18 RX ORDER — SODIUM CHLORIDE 0.9 % (FLUSH) 0.9 %
10 SYRINGE (ML) INJECTION
Status: CANCELLED | OUTPATIENT
Start: 2024-09-18

## 2024-09-18 RX ORDER — SODIUM CHLORIDE 0.9 % (FLUSH) 0.9 %
10 SYRINGE (ML) INJECTION
Status: DISCONTINUED | OUTPATIENT
Start: 2024-09-18 | End: 2024-09-18 | Stop reason: HOSPADM

## 2024-09-18 RX ORDER — EPINEPHRINE 0.3 MG/.3ML
0.3 INJECTION SUBCUTANEOUS ONCE AS NEEDED
OUTPATIENT
Start: 2024-09-18

## 2024-09-18 RX ORDER — DIPHENHYDRAMINE HYDROCHLORIDE 50 MG/ML
50 INJECTION INTRAMUSCULAR; INTRAVENOUS ONCE AS NEEDED
OUTPATIENT
Start: 2024-09-18

## 2024-09-18 RX ADMIN — FERUMOXYTOL 510 MG: 510 INJECTION INTRAVENOUS at 07:09

## 2024-09-18 RX ADMIN — SODIUM CHLORIDE: 9 INJECTION, SOLUTION INTRAVENOUS at 07:09

## 2024-09-18 NOTE — PLAN OF CARE
Problem: Adult Inpatient Plan of Care  Goal: Plan of Care Review  9/18/2024 0743 by Reina Nina RN  Outcome: Met  9/18/2024 0743 by Reina Nina RN  Outcome: Progressing  Goal: Patient-Specific Goal (Individualized)  9/18/2024 0743 by Reina Nina RN  Outcome: Met  9/18/2024 0743 by Reina Nina RN  Outcome: Progressing  Goal: Absence of Hospital-Acquired Illness or Injury  9/18/2024 0743 by Reina Nina RN  Outcome: Met  9/18/2024 0743 by Reina Nina RN  Outcome: Progressing  Goal: Optimal Comfort and Wellbeing  9/18/2024 0743 by Reina Nina RN  Outcome: Met  9/18/2024 0743 by Reina Nina RN  Outcome: Progressing  Goal: Readiness for Transition of Care  9/18/2024 0743 by Reina Nina RN  Outcome: Met  9/18/2024 0743 by Reina Nina RN  Outcome: Progressing

## 2024-10-18 ENCOUNTER — LAB VISIT (OUTPATIENT)
Dept: LAB | Facility: HOSPITAL | Age: 74
End: 2024-10-18
Attending: SPECIALIST
Payer: MEDICARE

## 2024-10-18 DIAGNOSIS — D50.9 IRON DEFICIENCY ANEMIA, UNSPECIFIED: Primary | ICD-10-CM

## 2024-10-18 DIAGNOSIS — K63.5 HYPERPLASTIC POLYP OF INTESTINE: ICD-10-CM

## 2024-10-18 LAB
OB PNL STL: NEGATIVE
OB PNL STL: NEGATIVE
OB PNL STL: POSITIVE

## 2024-10-18 PROCEDURE — 82272 OCCULT BLD FECES 1-3 TESTS: CPT | Mod: 91 | Performed by: SPECIALIST

## 2024-12-13 ENCOUNTER — PATIENT MESSAGE (OUTPATIENT)
Dept: HEMATOLOGY/ONCOLOGY | Facility: CLINIC | Age: 74
End: 2024-12-13
Payer: MEDICARE

## 2024-12-17 ENCOUNTER — LAB VISIT (OUTPATIENT)
Dept: LAB | Facility: HOSPITAL | Age: 74
End: 2024-12-17
Attending: NURSE PRACTITIONER
Payer: MEDICARE

## 2024-12-17 DIAGNOSIS — D50.9 IRON DEFICIENCY ANEMIA, UNSPECIFIED IRON DEFICIENCY ANEMIA TYPE: ICD-10-CM

## 2024-12-17 LAB
BASOPHILS # BLD AUTO: 0.02 K/UL (ref 0–0.2)
BASOPHILS NFR BLD: 0.4 % (ref 0–1.9)
DIFFERENTIAL METHOD BLD: ABNORMAL
EOSINOPHIL # BLD AUTO: 0.1 K/UL (ref 0–0.5)
EOSINOPHIL NFR BLD: 2 % (ref 0–8)
ERYTHROCYTE [DISTWIDTH] IN BLOOD BY AUTOMATED COUNT: 14.6 % (ref 11.5–14.5)
FERRITIN SERPL-MCNC: 11 NG/ML (ref 20–300)
HCT VFR BLD AUTO: 31.9 % (ref 37–48.5)
HGB BLD-MCNC: 10.5 G/DL (ref 12–16)
IMM GRANULOCYTES # BLD AUTO: 0.01 K/UL (ref 0–0.04)
IMM GRANULOCYTES NFR BLD AUTO: 0.2 % (ref 0–0.5)
IRON SERPL-MCNC: 115 UG/DL (ref 30–160)
LYMPHOCYTES # BLD AUTO: 1.3 K/UL (ref 1–4.8)
LYMPHOCYTES NFR BLD: 28.5 % (ref 18–48)
MCH RBC QN AUTO: 30.6 PG (ref 27–31)
MCHC RBC AUTO-ENTMCNC: 32.9 G/DL (ref 32–36)
MCV RBC AUTO: 93 FL (ref 82–98)
MONOCYTES # BLD AUTO: 0.4 K/UL (ref 0.3–1)
MONOCYTES NFR BLD: 9.6 % (ref 4–15)
NEUTROPHILS # BLD AUTO: 2.6 K/UL (ref 1.8–7.7)
NEUTROPHILS NFR BLD: 59.3 % (ref 38–73)
NRBC BLD-RTO: 0 /100 WBC
PLATELET # BLD AUTO: 274 K/UL (ref 150–450)
PMV BLD AUTO: 9.5 FL (ref 9.2–12.9)
RBC # BLD AUTO: 3.43 M/UL (ref 4–5.4)
SATURATED IRON: 27 % (ref 20–50)
TOTAL IRON BINDING CAPACITY: 423 UG/DL (ref 250–450)
TRANSFERRIN SERPL-MCNC: 302 MG/DL (ref 200–375)
WBC # BLD AUTO: 4.46 K/UL (ref 3.9–12.7)

## 2024-12-17 PROCEDURE — 83540 ASSAY OF IRON: CPT | Performed by: NURSE PRACTITIONER

## 2024-12-17 PROCEDURE — 82728 ASSAY OF FERRITIN: CPT | Performed by: NURSE PRACTITIONER

## 2024-12-17 PROCEDURE — 36415 COLL VENOUS BLD VENIPUNCTURE: CPT | Performed by: NURSE PRACTITIONER

## 2024-12-17 PROCEDURE — 85025 COMPLETE CBC W/AUTO DIFF WBC: CPT | Performed by: NURSE PRACTITIONER

## 2024-12-26 ENCOUNTER — OFFICE VISIT (OUTPATIENT)
Dept: HEMATOLOGY/ONCOLOGY | Facility: CLINIC | Age: 74
End: 2024-12-26
Payer: MEDICARE

## 2024-12-26 DIAGNOSIS — F41.9 ANXIETY: ICD-10-CM

## 2024-12-26 DIAGNOSIS — D50.9 IRON DEFICIENCY ANEMIA, UNSPECIFIED IRON DEFICIENCY ANEMIA TYPE: Primary | ICD-10-CM

## 2024-12-26 DIAGNOSIS — D50.9 IRON DEFICIENCY ANEMIA, UNSPECIFIED IRON DEFICIENCY ANEMIA TYPE: ICD-10-CM

## 2024-12-26 RX ORDER — DIAZEPAM 5 MG/1
10 TABLET ORAL EVERY 8 HOURS PRN
Qty: 30 TABLET | Refills: 0 | Status: SHIPPED | OUTPATIENT
Start: 2024-12-26 | End: 2025-12-26

## 2024-12-26 RX ORDER — SODIUM CHLORIDE 0.9 % (FLUSH) 0.9 %
10 SYRINGE (ML) INJECTION
OUTPATIENT
Start: 2024-12-26

## 2024-12-26 RX ORDER — DIPHENHYDRAMINE HYDROCHLORIDE 50 MG/ML
50 INJECTION INTRAMUSCULAR; INTRAVENOUS ONCE AS NEEDED
OUTPATIENT
Start: 2024-12-26

## 2024-12-26 RX ORDER — HEPARIN 100 UNIT/ML
500 SYRINGE INTRAVENOUS
OUTPATIENT
Start: 2024-12-26

## 2024-12-26 RX ORDER — EPINEPHRINE 0.3 MG/.3ML
0.3 INJECTION SUBCUTANEOUS ONCE AS NEEDED
OUTPATIENT
Start: 2024-12-26

## 2024-12-26 NOTE — PROGRESS NOTES
The patient location is: Home  The chief complaint leading to consultation is: Review of labs    Visit type: audiovisual    Face to Face time with patient: 20 minutes of total time spent on the encounter, which includes face to face time and non-face to face time preparing to see the patient (eg, review of tests), Obtaining and/or reviewing separately obtained history, Documenting clinical information in the electronic or other health record, Independently interpreting results (not separately reported) and communicating results to the patient/family/caregiver, or Care coordination (not separately reported).     Each patient to whom he or she provides medical services by telemedicine is:  (1) informed of the relationship between the physician and patient and the respective role of any other health care provider with respect to management of the patient; and (2) notified that he or she may decline to receive medical services by telemedicine and may withdraw from such care at any time.    History of present illness:  Ms. Polo is a 73-year-old female of Dr. Santiago's known for iron deficiency anemia who has completed IV iron replacement (2019, 2020, 2022 & recently 2023).      To note:  UGI/Colonoscopy done in 2020    Feraheme 510 m23 & 23 & 24    Colonoscopy: 24:  Dr. Hsieh:  Diverticulosis of large intestine without perforation or abs. Polyp - hyperplastic (Media)    Today:  24  Ms. Polo presents via VV to review labs from .  Last scopes done in   Reviewed labs; Hgb 10.5, but Ferritin low of 11 (6)  Remains active & well. Takes a Woman's MVI with iron for ladies over 50. Eats meat & foods with iron.   She voices some fatigue, but denies shortness of breath, CP, palpitations, bleeding, melena, pica, etc.  Anxiety controlled with Valium; watching children & carrying on ADL's without increased anxiety.  Requests a refill on  Valium.    Physical examination:  Well-developed, well-nourished, white female, in no acute distress, Alert and oriented x4.  Respiratory effort is normal.  Physical exam is deferred as this is a virtual encounter.    Lab Results   Component Value Date    WBC 4.46 12/17/2024    RBC 3.43 (L) 12/17/2024    HGB 10.5 (L) 12/17/2024    HCT 31.9 (L) 12/17/2024    MCV 93 12/17/2024    MCH 30.6 12/17/2024    MCHC 32.9 12/17/2024    RDW 14.6 (H) 12/17/2024     12/17/2024    MPV 9.5 12/17/2024    GRAN 2.6 12/17/2024    GRAN 59.3 12/17/2024    LYMPH 1.3 12/17/2024    LYMPH 28.5 12/17/2024    MONO 0.4 12/17/2024    MONO 9.6 12/17/2024    EOS 0.1 12/17/2024    BASO 0.02 12/17/2024    EOSINOPHIL 2.0 12/17/2024    BASOPHIL 0.4 12/17/2024        Lab Results   Component Value Date    IRON 115 12/17/2024    TRANSFERRIN 302 12/17/2024    TIBC 423 12/17/2024    LABIRON 14 (L) 04/30/2019    FESATURATED 27 12/17/2024      Lab Results   Component Value Date    FERRITIN 11 (L) 12/17/2024     Impression:  1. Recurring iron deficiency anemia - symptomatic  2. Generalized anxiety disorder - requested refill of Vallium    Plan:  1.  Arrange for Feraheme weekly x 2 in [place]; F/U in clinic in 4 months with CBC, Iron studies/ferritin prior ([place])  2.  Rx Valium refilled.       YESSENIA Mercedes, FNP-C  [facility]  20 minutes were spent in coordination of patient's care, record review and counseling.    Route Chart for Scheduling    Med Onc Chart Routing      Follow up with physician    Follow up with BRAYAN 4 months. f/u in 4 months with CBC, Iron studies/ferritin 1-2 days prior @ [facility]   Infusion scheduling note New or changed treatment   Feraheme 510 mg weekly x 2 @ [facility]:  request early infusion time   Injection scheduling note    Labs    Imaging    Pharmacy appointment    Other referrals                  Therapy Plan Information  FERAHEME (FERUMOXYTOL) TWO DOSES for EDITA (iron deficiency  anemia), noted on 12/26/2017  Anaphylaxis/Hypersensitivity  EPINEPHrine (EPIPEN) 0.3 mg/0.3 mL pen injection 0.3 mg  0.3 mg, Intramuscular, PRN  diphenhydrAMINE injection 50 mg  50 mg, Intravenous, PRN  hydrocortisone sodium succinate injection 100 mg  100 mg, Intravenous, PRN  Flushes  heparin, porcine (PF) 100 unit/mL injection flush 500 Units  500 Units, Intravenous, PRN      No therapy plan of the specified type found.    No therapy plan of the specified type found.          Answers submitted by the patient for this visit:  Review of Systems Questionnaire (Submitted on 12/20/2024)  appetite change : No  unexpected weight change: No  mouth sores: No  visual disturbance: No  cough: No  shortness of breath: No  chest pain: No  abdominal pain: No  diarrhea: No  frequency: No  back pain: No  rash: No  headaches: No  adenopathy: No  nervous/ anxious: No

## 2025-01-03 DIAGNOSIS — D50.9 IRON DEFICIENCY ANEMIA, UNSPECIFIED IRON DEFICIENCY ANEMIA TYPE: ICD-10-CM

## 2025-01-03 RX ORDER — DIAZEPAM 5 MG/1
10 TABLET ORAL EVERY 8 HOURS PRN
Qty: 30 TABLET | Refills: 0 | OUTPATIENT
Start: 2025-01-03 | End: 2026-01-03

## 2025-01-08 ENCOUNTER — INFUSION (OUTPATIENT)
Dept: INFUSION THERAPY | Facility: HOSPITAL | Age: 75
End: 2025-01-08
Attending: NURSE PRACTITIONER
Payer: MEDICARE

## 2025-01-08 VITALS
RESPIRATION RATE: 16 BRPM | WEIGHT: 140 LBS | TEMPERATURE: 98 F | SYSTOLIC BLOOD PRESSURE: 129 MMHG | DIASTOLIC BLOOD PRESSURE: 76 MMHG | HEIGHT: 63 IN | HEART RATE: 62 BPM | BODY MASS INDEX: 24.8 KG/M2 | OXYGEN SATURATION: 98 %

## 2025-01-08 DIAGNOSIS — D50.9 IRON DEFICIENCY ANEMIA, UNSPECIFIED IRON DEFICIENCY ANEMIA TYPE: Primary | ICD-10-CM

## 2025-01-08 PROCEDURE — 25000003 PHARM REV CODE 250: Performed by: NURSE PRACTITIONER

## 2025-01-08 PROCEDURE — 63600175 PHARM REV CODE 636 W HCPCS: Mod: JZ,TB | Performed by: NURSE PRACTITIONER

## 2025-01-08 PROCEDURE — A4216 STERILE WATER/SALINE, 10 ML: HCPCS | Performed by: NURSE PRACTITIONER

## 2025-01-08 PROCEDURE — 96374 THER/PROPH/DIAG INJ IV PUSH: CPT

## 2025-01-08 RX ORDER — HEPARIN 100 UNIT/ML
500 SYRINGE INTRAVENOUS
OUTPATIENT
Start: 2025-01-08

## 2025-01-08 RX ORDER — SODIUM CHLORIDE 0.9 % (FLUSH) 0.9 %
10 SYRINGE (ML) INJECTION
Status: DISCONTINUED | OUTPATIENT
Start: 2025-01-08 | End: 2025-01-08 | Stop reason: HOSPADM

## 2025-01-08 RX ORDER — SODIUM CHLORIDE 0.9 % (FLUSH) 0.9 %
10 SYRINGE (ML) INJECTION
OUTPATIENT
Start: 2025-01-08

## 2025-01-08 RX ORDER — DIPHENHYDRAMINE HYDROCHLORIDE 50 MG/ML
50 INJECTION INTRAMUSCULAR; INTRAVENOUS ONCE AS NEEDED
OUTPATIENT
Start: 2025-01-08

## 2025-01-08 RX ORDER — EPINEPHRINE 0.3 MG/.3ML
0.3 INJECTION SUBCUTANEOUS ONCE AS NEEDED
OUTPATIENT
Start: 2025-01-08

## 2025-01-08 RX ADMIN — Medication 10 ML: at 08:01

## 2025-01-08 RX ADMIN — FERUMOXYTOL 510 MG: 510 INJECTION INTRAVENOUS at 07:01

## 2025-01-08 RX ADMIN — Medication 10 ML: at 07:01

## 2025-01-08 NOTE — PLAN OF CARE
Problem: Adult Inpatient Plan of Care  Goal: Optimal Comfort and Wellbeing  Outcome: Progressing  Intervention: Provide Person-Centered Care  Flowsheets (Taken 1/8/2025 6690)  Trust Relationship/Rapport:   care explained   choices provided   emotional support provided   empathic listening provided   questions answered   questions encouraged   reassurance provided   thoughts/feelings acknowledged

## 2025-01-09 NOTE — PROGRESS NOTES
1/9 Reports that everything went well infusion. No complaints. Everyone was professional and caring.

## 2025-01-10 RX ORDER — DIAZEPAM 5 MG/1
10 TABLET ORAL EVERY 8 HOURS PRN
Qty: 30 TABLET | Refills: 0 | OUTPATIENT
Start: 2025-01-10 | End: 2026-01-10

## 2025-01-15 ENCOUNTER — INFUSION (OUTPATIENT)
Dept: INFUSION THERAPY | Facility: HOSPITAL | Age: 75
End: 2025-01-15
Attending: NURSE PRACTITIONER
Payer: MEDICARE

## 2025-01-15 VITALS
WEIGHT: 138 LBS | HEART RATE: 62 BPM | SYSTOLIC BLOOD PRESSURE: 112 MMHG | TEMPERATURE: 98 F | RESPIRATION RATE: 18 BRPM | HEIGHT: 63 IN | OXYGEN SATURATION: 98 % | DIASTOLIC BLOOD PRESSURE: 67 MMHG | BODY MASS INDEX: 24.45 KG/M2

## 2025-01-15 DIAGNOSIS — D50.9 IRON DEFICIENCY ANEMIA, UNSPECIFIED IRON DEFICIENCY ANEMIA TYPE: Primary | ICD-10-CM

## 2025-01-15 PROCEDURE — 25000003 PHARM REV CODE 250: Performed by: NURSE PRACTITIONER

## 2025-01-15 PROCEDURE — 63600175 PHARM REV CODE 636 W HCPCS: Mod: JZ,TB | Performed by: NURSE PRACTITIONER

## 2025-01-15 PROCEDURE — A4216 STERILE WATER/SALINE, 10 ML: HCPCS | Performed by: NURSE PRACTITIONER

## 2025-01-15 PROCEDURE — 96374 THER/PROPH/DIAG INJ IV PUSH: CPT

## 2025-01-15 RX ORDER — SODIUM CHLORIDE 0.9 % (FLUSH) 0.9 %
10 SYRINGE (ML) INJECTION
Status: CANCELLED | OUTPATIENT
Start: 2025-01-15

## 2025-01-15 RX ORDER — EPINEPHRINE 0.3 MG/.3ML
0.3 INJECTION SUBCUTANEOUS ONCE AS NEEDED
Status: CANCELLED | OUTPATIENT
Start: 2025-01-15

## 2025-01-15 RX ORDER — DIPHENHYDRAMINE HYDROCHLORIDE 50 MG/ML
50 INJECTION INTRAMUSCULAR; INTRAVENOUS ONCE AS NEEDED
Status: CANCELLED | OUTPATIENT
Start: 2025-01-15

## 2025-01-15 RX ORDER — HEPARIN 100 UNIT/ML
500 SYRINGE INTRAVENOUS
Status: CANCELLED | OUTPATIENT
Start: 2025-01-15

## 2025-01-15 RX ORDER — SODIUM CHLORIDE 0.9 % (FLUSH) 0.9 %
10 SYRINGE (ML) INJECTION
Status: DISCONTINUED | OUTPATIENT
Start: 2025-01-15 | End: 2025-01-15

## 2025-01-15 RX ADMIN — FERUMOXYTOL 510 MG: 510 INJECTION INTRAVENOUS at 07:01

## 2025-01-15 RX ADMIN — Medication 10 ML: at 08:01

## 2025-01-15 RX ADMIN — Medication 10 ML: at 07:01

## 2025-01-15 NOTE — PLAN OF CARE
Problem: Adult Inpatient Plan of Care  Goal: Optimal Comfort and Wellbeing  Outcome: Progressing  Intervention: Provide Person-Centered Care  Flowsheets (Taken 1/15/2025 7468)  Trust Relationship/Rapport:   care explained   choices provided   emotional support provided   empathic listening provided   questions answered   questions encouraged   reassurance provided   thoughts/feelings acknowledged

## 2025-03-29 ENCOUNTER — PATIENT MESSAGE (OUTPATIENT)
Dept: HEMATOLOGY/ONCOLOGY | Facility: CLINIC | Age: 75
End: 2025-03-29
Payer: MEDICARE

## 2025-04-25 ENCOUNTER — LAB VISIT (OUTPATIENT)
Dept: LAB | Facility: HOSPITAL | Age: 75
End: 2025-04-25
Attending: NURSE PRACTITIONER
Payer: MEDICARE

## 2025-04-25 DIAGNOSIS — D50.9 IRON DEFICIENCY ANEMIA, UNSPECIFIED IRON DEFICIENCY ANEMIA TYPE: ICD-10-CM

## 2025-04-25 LAB
ABSOLUTE EOSINOPHIL (SMH): 0.13 K/UL
ABSOLUTE MONOCYTE (SMH): 0.64 K/UL (ref 0.3–1)
ABSOLUTE NEUTROPHIL COUNT (SMH): 4 K/UL (ref 1.8–7.7)
BASOPHILS # BLD AUTO: 0.03 K/UL
BASOPHILS NFR BLD AUTO: 0.5 %
ERYTHROCYTE [DISTWIDTH] IN BLOOD BY AUTOMATED COUNT: 12.5 % (ref 11.5–14.5)
FERRITIN SERPL-MCNC: 13.1 NG/ML (ref 20–300)
HCT VFR BLD AUTO: 35.6 % (ref 37–48.5)
HGB BLD-MCNC: 11.6 GM/DL (ref 12–16)
IMM GRANULOCYTES # BLD AUTO: 0.02 K/UL (ref 0–0.04)
IMM GRANULOCYTES NFR BLD AUTO: 0.3 % (ref 0–0.5)
IRON SATN MFR SERPL: 9 % (ref 20–50)
IRON SERPL-MCNC: 36 UG/DL (ref 30–160)
LYMPHOCYTES # BLD AUTO: 1.03 K/UL (ref 1–4.8)
MCH RBC QN AUTO: 31.9 PG (ref 27–31)
MCHC RBC AUTO-ENTMCNC: 32.6 G/DL (ref 32–36)
MCV RBC AUTO: 98 FL (ref 82–98)
NUCLEATED RBC (/100WBC) (SMH): 0 /100 WBC
PLATELET # BLD AUTO: 297 K/UL (ref 150–450)
PMV BLD AUTO: 9.5 FL (ref 9.2–12.9)
RBC # BLD AUTO: 3.64 M/UL (ref 4–5.4)
RELATIVE EOSINOPHIL (SMH): 2.2 % (ref 0–8)
RELATIVE LYMPHOCYTE (SMH): 17.7 % (ref 18–48)
RELATIVE MONOCYTE (SMH): 11 % (ref 4–15)
RELATIVE NEUTROPHIL (SMH): 68.3 % (ref 38–73)
TIBC SERPL-MCNC: 422 UG/DL (ref 250–450)
TRANSFERRIN SERPL-MCNC: 285 MG/DL (ref 200–375)
WBC # BLD AUTO: 5.82 K/UL (ref 3.9–12.7)

## 2025-04-25 PROCEDURE — 85025 COMPLETE CBC W/AUTO DIFF WBC: CPT

## 2025-04-25 PROCEDURE — 82728 ASSAY OF FERRITIN: CPT

## 2025-04-25 PROCEDURE — 83540 ASSAY OF IRON: CPT

## 2025-04-25 PROCEDURE — 36415 COLL VENOUS BLD VENIPUNCTURE: CPT

## 2025-04-30 ENCOUNTER — TELEPHONE (OUTPATIENT)
Dept: HEMATOLOGY/ONCOLOGY | Facility: CLINIC | Age: 75
End: 2025-04-30
Payer: MEDICARE

## 2025-05-01 ENCOUNTER — OFFICE VISIT (OUTPATIENT)
Dept: HEMATOLOGY/ONCOLOGY | Facility: CLINIC | Age: 75
End: 2025-05-01
Payer: MEDICARE

## 2025-05-01 DIAGNOSIS — D50.9 IRON DEFICIENCY ANEMIA, UNSPECIFIED IRON DEFICIENCY ANEMIA TYPE: ICD-10-CM

## 2025-05-01 DIAGNOSIS — D50.8 IRON DEFICIENCY ANEMIA SECONDARY TO INADEQUATE DIETARY IRON INTAKE: Primary | ICD-10-CM

## 2025-05-01 RX ORDER — EPINEPHRINE 0.3 MG/.3ML
0.3 INJECTION SUBCUTANEOUS ONCE AS NEEDED
OUTPATIENT
Start: 2025-05-01

## 2025-05-01 RX ORDER — HEPARIN 100 UNIT/ML
500 SYRINGE INTRAVENOUS
OUTPATIENT
Start: 2025-05-01

## 2025-05-01 RX ORDER — SODIUM CHLORIDE 0.9 % (FLUSH) 0.9 %
10 SYRINGE (ML) INJECTION
OUTPATIENT
Start: 2025-05-01

## 2025-05-01 RX ORDER — DIAZEPAM 5 MG/1
10 TABLET ORAL EVERY 8 HOURS PRN
Qty: 30 TABLET | Refills: 0 | Status: SHIPPED | OUTPATIENT
Start: 2025-05-01 | End: 2026-05-01

## 2025-05-01 NOTE — PROGRESS NOTES
The patient location is: Home  The chief complaint leading to consultation is: Review of labs    Visit type: audiovisual    Face to Face time with patient: 20 minutes of total time spent on the encounter, which includes face to face time and non-face to face time preparing to see the patient (eg, review of tests), Obtaining and/or reviewing separately obtained history, Documenting clinical information in the electronic or other health record, Independently interpreting results (not separately reported) and communicating results to the patient/family/caregiver, or Care coordination (not separately reported).     Each patient to whom he or she provides medical services by telemedicine is:  (1) informed of the relationship between the physician and patient and the respective role of any other health care provider with respect to management of the patient; and (2) notified that he or she may decline to receive medical services by telemedicine and may withdraw from such care at any time.    History of present illness:  Ms. Polo is a 73-year-old female of Dr. Santiago's known for iron deficiency anemia who has completed IV iron replacement (2019, 2020, 2022 & recently 2023).      To note:  UGI/Colonoscopy done in 2020    Feraheme 510 m23 & 23 & 24 & 01/15/25    Colonoscopy: 24:  Dr. Hsieh:  Diverticulosis of large intestine without perforation or abs. Polyp - hyperplastic (Media)    Today:  25  Ms. Polo presents with her  (MD) via VV for evaluation post IV iron in .    Reviewed labs; Hgb 11.6 from (10.5), Ferritin 13.1 from (11)  Remains active & well. Continues to take a Woman's MVI with iron for ladies over 50. Eats meat & foods with iron.   She voices some fatigue, but denies shortness of breath, CP, palpitations, bleeding, melena, pica, etc.  Planning a trip the end of .  Anxiety controlled with Valium; watching  children & carrying on ADL's without increased anxiety.  Requests a refill on Valium.        Answers submitted by the patient for this visit:  Review of Systems Questionnaire (Submitted on 4/25/2025)  appetite change : No  mouth sores: No  cough: No  shortness of breath: No  chest pain: No  diarrhea: No  rash: No  headaches: No  adenopathy: No    Physical examination:  Well-developed, well-nourished, white female, in no acute distress, Alert and oriented x4.  Respiratory effort is normal.  Physical exam is deferred as this is a virtual encounter.    Lab Results   Component Value Date    WBC 5.82 04/25/2025    RBC 3.64 (L) 04/25/2025    HGB 11.6 (L) 04/25/2025    HCT 35.6 (L) 04/25/2025    MCV 98 04/25/2025    MCH 31.9 (H) 04/25/2025    MCHC 32.6 04/25/2025    RDW 12.5 04/25/2025     04/25/2025    MPV 9.5 04/25/2025    GRAN 2.6 12/17/2024    GRAN 59.3 12/17/2024    LYMPH 1.3 12/17/2024    LYMPH 28.5 12/17/2024    MONO 0.4 12/17/2024    MONO 9.6 12/17/2024    EOS 0.1 12/17/2024    BASO 0.02 12/17/2024    EOSINOPHIL 2.0 12/17/2024    BASOPHIL 0.4 12/17/2024        Lab Results   Component Value Date    IRON 36 04/25/2025    TRANSFERRIN 285 04/25/2025    TIBC 422 04/25/2025    LABIRON 9 (L) 04/25/2025    FESATURATED 27 12/17/2024      Lab Results   Component Value Date    FERRITIN 13.1 (L) 04/25/2025     Impression:  1. Recurring iron deficiency anemia - slight symptomatic; will rechallenge with Feraheme 510 mg x 1  2. Generalized anxiety disorder - requested refill of Vallium    Plan:  1.  Arrange for Feraheme weekly x 1 in [place]; F/U in clinic in 3 months with CBC, Iron studies/ferritin prior ([place])  2.  Rx Valium refilled.       YESSENIA Mercedes, FNP-C  [facility]  20 minutes were spent in coordination of patient's care, record review and counseling.    Route Chart for Scheduling    Med Onc Chart Routing      Follow up with physician    Follow up with BRAYAN 3  months. f/u in 3 months with labs 1-2 days prior in [place]:  CBC, iron studies/ferritin   Infusion scheduling note New or changed treatment   Feraheme 510 mg IV x 1   Injection scheduling note    Labs    Imaging    Pharmacy appointment    Other referrals                  Therapy Plan Information  OP IV IRON THERAPY for EDITA (iron deficiency anemia), noted on 12/26/2017  Medications  ferumoxytoL (Feraheme) 510 mg in D5W 117 mL IVPB  510 mg, Intravenous, Once  Anaphylaxis/Hypersensitivity  EPINEPHrine (EPIPEN) 0.3 mg/0.3 mL pen injection 0.3 mg  0.3 mg, Intramuscular, PRN  hydrocortisone sodium succinate injection 100 mg  100 mg, Intravenous, PRN  Flushes  0.9% NaCl 250 mL flush bag  Intravenous, Every visit  sodium chloride 0.9% flush 10 mL  10 mL, Intravenous, Every visit  heparin, porcine (PF) 100 unit/mL injection flush 500 Units  500 Units, Intravenous, Every visit  alteplase injection 2 mg  2 mg, Intra-Catheter, Every visit      No therapy plan of the specified type found.    No therapy plan of the specified type found.

## 2025-05-02 ENCOUNTER — PATIENT MESSAGE (OUTPATIENT)
Dept: HEMATOLOGY/ONCOLOGY | Facility: CLINIC | Age: 75
End: 2025-05-02
Payer: MEDICARE

## 2025-05-08 ENCOUNTER — INFUSION (OUTPATIENT)
Dept: INFUSION THERAPY | Facility: HOSPITAL | Age: 75
End: 2025-05-08
Attending: NURSE PRACTITIONER
Payer: MEDICARE

## 2025-05-08 VITALS
TEMPERATURE: 98 F | WEIGHT: 140.31 LBS | BODY MASS INDEX: 24.86 KG/M2 | HEIGHT: 63 IN | OXYGEN SATURATION: 94 % | HEART RATE: 59 BPM | SYSTOLIC BLOOD PRESSURE: 117 MMHG | DIASTOLIC BLOOD PRESSURE: 63 MMHG | RESPIRATION RATE: 18 BRPM

## 2025-05-08 DIAGNOSIS — D50.9 IRON DEFICIENCY ANEMIA, UNSPECIFIED IRON DEFICIENCY ANEMIA TYPE: Primary | ICD-10-CM

## 2025-05-08 PROCEDURE — 63600175 PHARM REV CODE 636 W HCPCS: Mod: JZ,TB | Performed by: NURSE PRACTITIONER

## 2025-05-08 PROCEDURE — 25000003 PHARM REV CODE 250: Performed by: NURSE PRACTITIONER

## 2025-05-08 PROCEDURE — 96365 THER/PROPH/DIAG IV INF INIT: CPT

## 2025-05-08 RX ORDER — HEPARIN 100 UNIT/ML
500 SYRINGE INTRAVENOUS
OUTPATIENT
Start: 2025-05-08

## 2025-05-08 RX ORDER — SODIUM CHLORIDE 0.9 % (FLUSH) 0.9 %
10 SYRINGE (ML) INJECTION
Status: DISCONTINUED | OUTPATIENT
Start: 2025-05-08 | End: 2025-05-08 | Stop reason: HOSPADM

## 2025-05-08 RX ORDER — EPINEPHRINE 0.3 MG/.3ML
0.3 INJECTION SUBCUTANEOUS ONCE AS NEEDED
OUTPATIENT
Start: 2025-05-08

## 2025-05-08 RX ORDER — HEPARIN 100 UNIT/ML
500 SYRINGE INTRAVENOUS
Status: DISCONTINUED | OUTPATIENT
Start: 2025-05-08 | End: 2025-05-08 | Stop reason: HOSPADM

## 2025-05-08 RX ORDER — SODIUM CHLORIDE 0.9 % (FLUSH) 0.9 %
10 SYRINGE (ML) INJECTION
OUTPATIENT
Start: 2025-05-08

## 2025-05-08 RX ADMIN — FERUMOXYTOL 510 MG: 510 INJECTION INTRAVENOUS at 07:05

## 2025-05-08 NOTE — PLAN OF CARE
Tolerated iron infusion well with no s/s of reaction or c/o acute side effects.  Vital signs stable.  IV discontinued with tip intact.  Patient discharged.

## 2025-05-09 NOTE — PROGRESS NOTES
5/9 Reports that everything went well infusion. No complaints. Everyone was professional and caring.

## 2025-07-28 ENCOUNTER — LAB VISIT (OUTPATIENT)
Dept: LAB | Facility: HOSPITAL | Age: 75
End: 2025-07-28
Attending: NURSE PRACTITIONER
Payer: MEDICARE

## 2025-07-28 DIAGNOSIS — D50.8 IRON DEFICIENCY ANEMIA SECONDARY TO INADEQUATE DIETARY IRON INTAKE: ICD-10-CM

## 2025-07-28 LAB
ABSOLUTE EOSINOPHIL (SMH): 0.19 K/UL
ABSOLUTE MONOCYTE (SMH): 0.59 K/UL (ref 0.3–1)
ABSOLUTE NEUTROPHIL COUNT (SMH): 3.4 K/UL (ref 1.8–7.7)
BASOPHILS # BLD AUTO: 0.05 K/UL
BASOPHILS NFR BLD AUTO: 0.9 %
ERYTHROCYTE [DISTWIDTH] IN BLOOD BY AUTOMATED COUNT: 12 % (ref 11.5–14.5)
FERRITIN SERPL-MCNC: 31.6 NG/ML (ref 20–300)
HCT VFR BLD AUTO: 31.9 % (ref 37–48.5)
HGB BLD-MCNC: 10.6 GM/DL (ref 12–16)
IMM GRANULOCYTES # BLD AUTO: 0.02 K/UL (ref 0–0.04)
IMM GRANULOCYTES NFR BLD AUTO: 0.4 % (ref 0–0.5)
IRON SATN MFR SERPL: 24 % (ref 20–50)
IRON SERPL-MCNC: 88 UG/DL (ref 30–160)
LYMPHOCYTES # BLD AUTO: 1.2 K/UL (ref 1–4.8)
MCH RBC QN AUTO: 31.3 PG (ref 27–31)
MCHC RBC AUTO-ENTMCNC: 33.2 G/DL (ref 32–36)
MCV RBC AUTO: 94 FL (ref 82–98)
NUCLEATED RBC (/100WBC) (SMH): 0 /100 WBC
PLATELET # BLD AUTO: 352 K/UL (ref 150–450)
PMV BLD AUTO: 9.2 FL (ref 9.2–12.9)
RBC # BLD AUTO: 3.39 M/UL (ref 4–5.4)
RELATIVE EOSINOPHIL (SMH): 3.5 % (ref 0–8)
RELATIVE LYMPHOCYTE (SMH): 22 % (ref 18–48)
RELATIVE MONOCYTE (SMH): 10.8 % (ref 4–15)
RELATIVE NEUTROPHIL (SMH): 62.4 % (ref 38–73)
TIBC SERPL-MCNC: 360 UG/DL (ref 250–450)
TRANSFERRIN SERPL-MCNC: 243 MG/DL (ref 200–375)
WBC # BLD AUTO: 5.46 K/UL (ref 3.9–12.7)

## 2025-07-28 PROCEDURE — 82728 ASSAY OF FERRITIN: CPT

## 2025-07-28 PROCEDURE — 83540 ASSAY OF IRON: CPT

## 2025-07-28 PROCEDURE — 36415 COLL VENOUS BLD VENIPUNCTURE: CPT

## 2025-07-28 PROCEDURE — 85025 COMPLETE CBC W/AUTO DIFF WBC: CPT

## 2025-07-31 NOTE — PROGRESS NOTES
The patient location is: Home  The chief complaint leading to consultation is: Review of labs    Visit type: audiovisual    Face to Face time with patient: 23 minutes of total time spent on the encounter, which includes face to face time and non-face to face time preparing to see the patient (eg, review of tests), Obtaining and/or reviewing separately obtained history, Documenting clinical information in the electronic or other health record, Independently interpreting results (not separately reported) and communicating results to the patient/family/caregiver, or Care coordination (not separately reported).     Each patient to whom he or she provides medical services by telemedicine is:  (1) informed of the relationship between the physician and patient and the respective role of any other health care provider with respect to management of the patient; and (2) notified that he or she may decline to receive medical services by telemedicine and may withdraw from such care at any time.    History of present illness:  Ms. Polo is a 74-year-old female of Dr. Santiago's known for iron deficiency anemia who has completed IV iron replacement (2019, 2020, 2022, 2023).  She has been scoped & followed by GI for bleeding with no bleeding source revealed.  EDITA r/t absorption difficulties.    To note:  UGI/Colonoscopy done in 2020    Colonoscopy: 24:  Dr. Hsieh:  Diverticulosis of large intestine without perforation or abs. Polyp - hyperplastic (Media)    Feraheme 510 m23 & 23 & 24 & 01/15/25                                      05/08/25    Today:  25  Ms. Polo presents with her  (MD) via VV for interval evaluation of EDITA.  Reviewed labs; Hgb 10.6 from 11.6, Ferritin 31.6 (13.1), Iron indices wnl  She voices some fatigue, but denies shortness of breath, CP, palpitations, bleeding, melena, pica, etc.  Continues to take a Woman's MVI with  iron for ladies over 50. Eats meat & foods with iron.   Anxiety controlled with Valium; watching children & carrying on ADL's without increased anxiety.  Requests a refill on Valium.      Answers submitted by the patient for this visit:  Review of Systems Questionnaire (Submitted on 7/27/2025)  appetite change : No  unexpected weight change: No  mouth sores: No  visual disturbance: No  cough: No  shortness of breath: No  chest pain: No  abdominal pain: No  diarrhea: No  frequency: No  back pain: No  rash: No  headaches: No  adenopathy: No  nervous/ anxious: No    Physical examination:  Well-developed, well-nourished, white female, in no acute distress, Alert and oriented x4.  Respiratory effort is normal.  Physical exam is deferred as this is a virtual encounter.    Lab Results   Component Value Date    WBC 5.46 07/28/2025    RBC 3.39 (L) 07/28/2025    HGB 10.6 (L) 07/28/2025    HCT 31.9 (L) 07/28/2025    MCV 94 07/28/2025    MCH 31.3 (H) 07/28/2025    MCHC 33.2 07/28/2025    RDW 12.0 07/28/2025     07/28/2025    MPV 9.2 07/28/2025    GRAN 2.6 12/17/2024    GRAN 59.3 12/17/2024    LYMPH 1.3 12/17/2024    LYMPH 28.5 12/17/2024    MONO 0.4 12/17/2024    MONO 9.6 12/17/2024    EOS 0.1 12/17/2024    BASO 0.02 12/17/2024    EOSINOPHIL 2.0 12/17/2024    BASOPHIL 0.4 12/17/2024        Lab Results   Component Value Date    IRON 88 07/28/2025    TRANSFERRIN 243 07/28/2025    TIBC 360 07/28/2025    LABIRON 24 07/28/2025    FESATURATED 27 12/17/2024      Lab Results   Component Value Date    FERRITIN 31.6 07/28/2025     Impression:  1. Recurring iron deficiency anemia - slight symptomatic; will rechallenge with Injectafer 750 mg x 1  2. Generalized anxiety disorder - requested refill of Valium    Plan:  1.  Arrange for Injectafer 750 mg x 1 in [place]; F/U in clinic in 3 months with Stepan with CBC, Iron studies/ferritin prior ([place])  2.  Rx Valium refilled. Discussed that patient will have to establish with PCP for  refills going forward.       YESSENIA Mercedes, FNP-C  [facility]  23 minutes were spent in coordination of patient's care, record review and counseling.    Route Chart for Scheduling    Med Onc Chart Routing      Follow up with physician    Follow up with BRAYAN 3 months. f/u VV with Stepan in 3 months with labs 1-2 days prior in [place]:  CBC, Iron studies/ferritin   Infusion scheduling note New or changed treatment   Injectafer 750 mg x 1 @ [facility]   Injection scheduling note    Labs    Imaging    Pharmacy appointment    Other referrals                Therapy Plan Information  OP IV IRON THERAPY for EDITA (iron deficiency anemia), noted on 12/26/2017  Anaphylaxis/Hypersensitivity  EPINEPHrine (EPIPEN) 0.3 mg/0.3 mL pen injection 0.3 mg  0.3 mg, Intramuscular, PRN  hydrocortisone sodium succinate injection 100 mg  100 mg, Intravenous, PRN  Flushes  0.9% NaCl 250 mL flush bag  Intravenous, Every visit  sodium chloride 0.9% flush 10 mL  10 mL, Intravenous, Every visit  heparin, porcine (PF) 100 unit/mL injection flush 500 Units  500 Units, Intravenous, Every visit  alteplase injection 2 mg  2 mg, Intra-Catheter, Every visit      No therapy plan of the specified type found.    No therapy plan of the specified type found.

## 2025-08-01 ENCOUNTER — OFFICE VISIT (OUTPATIENT)
Dept: HEMATOLOGY/ONCOLOGY | Facility: CLINIC | Age: 75
End: 2025-08-01
Payer: MEDICARE

## 2025-08-01 ENCOUNTER — TELEPHONE (OUTPATIENT)
Dept: HEMATOLOGY/ONCOLOGY | Facility: CLINIC | Age: 75
End: 2025-08-01
Payer: MEDICARE

## 2025-08-01 DIAGNOSIS — D50.8 IRON DEFICIENCY ANEMIA SECONDARY TO INADEQUATE DIETARY IRON INTAKE: Primary | ICD-10-CM

## 2025-08-01 DIAGNOSIS — F41.9 ANXIETY: ICD-10-CM

## 2025-08-01 DIAGNOSIS — D50.9 IRON DEFICIENCY ANEMIA, UNSPECIFIED IRON DEFICIENCY ANEMIA TYPE: ICD-10-CM

## 2025-08-01 RX ORDER — EPINEPHRINE 0.3 MG/.3ML
0.3 INJECTION SUBCUTANEOUS ONCE AS NEEDED
OUTPATIENT
Start: 2025-08-01

## 2025-08-01 RX ORDER — DIAZEPAM 5 MG/1
10 TABLET ORAL EVERY 8 HOURS PRN
Qty: 30 TABLET | Refills: 0 | Status: SHIPPED | OUTPATIENT
Start: 2025-08-01 | End: 2025-08-01

## 2025-08-01 RX ORDER — DIAZEPAM 5 MG/1
10 TABLET ORAL EVERY 8 HOURS PRN
Qty: 30 TABLET | Refills: 0 | Status: SHIPPED | OUTPATIENT
Start: 2025-08-01 | End: 2026-08-01

## 2025-08-01 RX ORDER — SODIUM CHLORIDE 0.9 % (FLUSH) 0.9 %
10 SYRINGE (ML) INJECTION
OUTPATIENT
Start: 2025-08-01

## 2025-08-01 RX ORDER — HEPARIN 100 UNIT/ML
500 SYRINGE INTRAVENOUS
OUTPATIENT
Start: 2025-08-01

## 2025-08-07 ENCOUNTER — INFUSION (OUTPATIENT)
Dept: INFUSION THERAPY | Facility: HOSPITAL | Age: 75
End: 2025-08-07
Payer: MEDICARE

## 2025-08-07 VITALS
BODY MASS INDEX: 24.7 KG/M2 | HEIGHT: 63 IN | SYSTOLIC BLOOD PRESSURE: 117 MMHG | OXYGEN SATURATION: 99 % | TEMPERATURE: 97 F | RESPIRATION RATE: 16 BRPM | HEART RATE: 55 BPM | DIASTOLIC BLOOD PRESSURE: 65 MMHG | WEIGHT: 139.38 LBS

## 2025-08-07 DIAGNOSIS — D50.9 IRON DEFICIENCY ANEMIA, UNSPECIFIED IRON DEFICIENCY ANEMIA TYPE: Primary | ICD-10-CM

## 2025-08-07 PROCEDURE — 63600175 PHARM REV CODE 636 W HCPCS: Mod: JZ,TB | Performed by: NURSE PRACTITIONER

## 2025-08-07 PROCEDURE — 25000003 PHARM REV CODE 250: Performed by: NURSE PRACTITIONER

## 2025-08-07 PROCEDURE — A4216 STERILE WATER/SALINE, 10 ML: HCPCS | Performed by: NURSE PRACTITIONER

## 2025-08-07 PROCEDURE — 96374 THER/PROPH/DIAG INJ IV PUSH: CPT

## 2025-08-07 RX ORDER — HEPARIN 100 UNIT/ML
500 SYRINGE INTRAVENOUS
Status: CANCELLED | OUTPATIENT
Start: 2025-08-07

## 2025-08-07 RX ORDER — SODIUM CHLORIDE 0.9 % (FLUSH) 0.9 %
10 SYRINGE (ML) INJECTION
Status: DISCONTINUED | OUTPATIENT
Start: 2025-08-07 | End: 2025-08-07

## 2025-08-07 RX ORDER — SODIUM CHLORIDE 0.9 % (FLUSH) 0.9 %
10 SYRINGE (ML) INJECTION
Status: CANCELLED | OUTPATIENT
Start: 2025-08-07

## 2025-08-07 RX ORDER — EPINEPHRINE 0.3 MG/.3ML
0.3 INJECTION SUBCUTANEOUS ONCE AS NEEDED
Status: CANCELLED | OUTPATIENT
Start: 2025-08-07

## 2025-08-07 RX ORDER — HEPARIN 100 UNIT/ML
500 SYRINGE INTRAVENOUS
Status: DISCONTINUED | OUTPATIENT
Start: 2025-08-07 | End: 2025-08-07

## 2025-08-07 RX ADMIN — Medication 10 ML: at 10:08

## 2025-08-07 RX ADMIN — FERRIC CARBOXYMALTOSE INJECTION 750 MG: 50 INJECTION, SOLUTION INTRAVENOUS at 10:08

## 2025-08-07 NOTE — PLAN OF CARE
Problem: Adult Inpatient Plan of Care  Goal: Optimal Comfort and Wellbeing  Outcome: Progressing  Intervention: Provide Person-Centered Care  Flowsheets (Taken 8/7/2025 1013)  Trust Relationship/Rapport:   care explained   thoughts/feelings acknowledged   choices provided   emotional support provided   empathic listening provided   questions answered   questions encouraged   reassurance provided

## 2025-08-08 NOTE — PROGRESS NOTES
8/8 Reports that everything went well infusion. No complaints. Everyone was professional and caring.

## 2025-08-25 ENCOUNTER — PATIENT MESSAGE (OUTPATIENT)
Dept: HEMATOLOGY/ONCOLOGY | Facility: CLINIC | Age: 75
End: 2025-08-25
Payer: MEDICARE